# Patient Record
Sex: FEMALE | Race: WHITE | NOT HISPANIC OR LATINO | Employment: UNEMPLOYED | URBAN - METROPOLITAN AREA
[De-identification: names, ages, dates, MRNs, and addresses within clinical notes are randomized per-mention and may not be internally consistent; named-entity substitution may affect disease eponyms.]

---

## 2022-02-11 ENCOUNTER — TRANSFERRED RECORDS (OUTPATIENT)
Dept: HEALTH INFORMATION MANAGEMENT | Facility: CLINIC | Age: 47
End: 2022-02-11

## 2022-06-28 ENCOUNTER — MEDICAL CORRESPONDENCE (OUTPATIENT)
Dept: HEALTH INFORMATION MANAGEMENT | Facility: CLINIC | Age: 47
End: 2022-06-28

## 2022-07-01 ENCOUNTER — TRANSCRIBE ORDERS (OUTPATIENT)
Dept: OTHER | Age: 47
End: 2022-07-01

## 2022-07-01 DIAGNOSIS — G24.4 OROMANDIBULAR DYSTONIA: Primary | ICD-10-CM

## 2022-07-24 ENCOUNTER — HEALTH MAINTENANCE LETTER (OUTPATIENT)
Age: 47
End: 2022-07-24

## 2022-08-22 NOTE — TELEPHONE ENCOUNTER
RECORDS RECEIVED FROM: [facility]    REASON FOR VISIT: Oromandibular dystonia   Date of Appt: 12/05/2022   NOTES (FOR ALL VISITS) STATUS DETAILS   OFFICE NOTE from referring provider Care Everywhere 06/28/2022 [facility]   08/02/2022 orders only   OFFICE NOTE from other specialist Care Everywhere 03/08/2022 [facility]   02/11/2022 [facility]    DISCHARGE SUMMARY from hospital N/A    DISCHARGE REPORT from the ER N/A    OPERATIVE REPORT N/A    MEDICATION LIST N/A    IMAGING  (FOR ALL VISITS)     EMG N/A    EEG N/A    LUMBAR PUNCTURE N/A    BRITTANY SCAN N/A    ULTRASOUND (CAROTID BILAT) *VASCULAR* N/A    MRI (HEAD, NECK, SPINE) Received 02/25/2022 brain   CT (HEAD, NECK, SPINE) N/A       Images in PACS

## 2022-10-03 ENCOUNTER — HEALTH MAINTENANCE LETTER (OUTPATIENT)
Age: 47
End: 2022-10-03

## 2022-11-29 PROBLEM — G47.419 NARCOLEPSY WITHOUT CATAPLEXY(347.00): Status: ACTIVE | Noted: 2018-04-26

## 2022-11-29 PROBLEM — F31.76 BIPOLAR DISORDER, IN FULL REMISSION, MOST RECENT EPISODE DEPRESSED (H): Status: ACTIVE | Noted: 2018-04-12

## 2022-11-29 PROBLEM — E66.811 OBESITY, CLASS I, BMI 30.0-34.9 (SEE ACTUAL BMI): Status: ACTIVE | Noted: 2017-10-23

## 2022-11-29 PROBLEM — G43.909 MIGRAINE WITHOUT STATUS MIGRAINOSUS, NOT INTRACTABLE, UNSPECIFIED MIGRAINE TYPE: Status: ACTIVE | Noted: 2017-01-27

## 2022-12-05 ENCOUNTER — PRE VISIT (OUTPATIENT)
Dept: NEUROLOGY | Facility: CLINIC | Age: 47
End: 2022-12-05

## 2022-12-05 NOTE — PROGRESS NOTES
Department of Neurology  Movement Disorders Division   New Patient Visit    Patient: Purvi Pearce   MRN: [id number]   : 1975   Date of Visit: 2022    CC: involuntary movements    HPI:  Ms. Pearce is a 46 yo woman who presents for evaluation of involuntary movements.  Her movements began over 18 months year ago with a gradual onset.  At first, she didn't notice them but her  pointed them out.  Started around her mouth.  She can't suppress them.  Involves her whole body.  At night she has spasms while trying to fall asleep.  Worsened by being let go from her job.  Sometimes she feels an urge to move.  She is able to suppress it temporarily.  She thinks it's worse in the evenings.  She is stumbling more and dropping things.    She is also reporting cognitive changes.  For example, couldn't remember her passwords.  Loses her train of thought easily.  Also calling objects by the wrong name.  Has trouble with concepts of time.  May have interfered with her job.    Endorses dysphagia.    She had COVID in 2020.  She was on Abilify and Risperdal previously.  She has also taken antiemetics previously.  Zofran sounded familiar to her.    She took CD/LD but didn't notice any change. She has been on Cogentin for awhile but it is causing dry mouth.    They describe an episode where she was found in the kitchen with her hands full and appeared to be seeing things.  The following night she put a bowl and spoon in the kitchen in the middle of the night.  This happened last year.  No other clear episodes but he notices objects put away in odd places.    She is working with a psychiatrist every 3 months and a therapist weekly.      Previously on aripiprazole.  Started on CD/LD to rule out dopa-responsive dystonia.  CAG repeat: 18 and 20 (Normal)    Denies family hx of chorea.  Her mother had dementia and  in her early 70s.  She was in a nursing home for about 10 years.        ROS:  All others negative  except as listed above.    Past Medical History:   Diagnosis Date     Bipolar disorder (H)         Past Surgical History:   Procedure Laterality Date     FOOT SURGERY Right      TONSILLECTOMY & ADENOIDECTOMY       TUBAL LIGATION          Current Outpatient Medications   Medication Sig Dispense Refill     amphetamine-dextroamphetamine (ADDERALL) 20 MG tablet Take 20 mg by mouth 3 times daily       benztropine (COGENTIN) 1 MG tablet Take  by mouth 2 tablets in the morning and 1 tablet at bedtime.       busPIRone HCl (BUSPAR) 30 MG tablet Take 1 tablet by mouth 2 times daily       cholecalciferol 25 MCG (1000 UT) TABS Take 1,000 Units by mouth daily       FLUoxetine (PROZAC) 40 MG capsule Take 80 mg by mouth daily       hydrOXYzine (VISTARIL) 50 MG capsule TAKE 1 TAB BY MOUTH DAILY IN THE MORNING AS NEEDED FOR ANXIETY       Hypertonic Nasal Wash (SINUS RINSE) bottle Instill 1 Each nasally 2 (two) times a day.       LORazepam (ATIVAN) 1 MG tablet Take 1 Tablet by mouth one time a day as needed for panic ; May have 7 tabs each month.       triamcinolone (NASACORT) 55 MCG/ACT nasal aerosol Spray 1 spray into both nostrils twice a day as directed       trihexyphenidyl (ARTANE) 2 MG tablet        carbidopa-levodopa (SINEMET)  MG tablet Take 1 tablet by mouth 3 times daily (Patient not taking: Reported on 12/9/2022)         Allergies   Allergen Reactions     Penicillins Hives        Family History   Problem Relation Age of Onset     Dementia Mother         Social History     Socioeconomic History     Marital status:      Spouse name: Not on file     Number of children: Not on file     Years of education: Not on file     Highest education level: Not on file   Occupational History     Not on file   Tobacco Use     Smoking status: Not on file     Smokeless tobacco: Not on file   Substance and Sexual Activity     Alcohol use: Not on file     Drug use: Not on file     Sexual activity: Not on file   Other Topics  Concern     Not on file   Social History Narrative     Not on file     Social Determinants of Health     Financial Resource Strain: Not on file   Food Insecurity: Not on file   Transportation Needs: Not on file   Physical Activity: Not on file   Stress: Not on file   Social Connections: Not on file   Intimate Partner Violence: Not on file   Housing Stability: Not on file        PHYSICAL EXAM:  /87   Pulse 105   Resp 17   SpO2 97%     Gen: alert, active, attentive, appropriately groomed   HEENT: normocephalic, eyes open with no discharge  Psych: mood stable     NEURO:  CN:  II: Pupils equal, round, and reactive to light. VFF.  III, IV, VI: EOM normal range, no nystagmus.  Normal saccades.  V:  Facial sensation intact.  VII: Face symmetric at rest and with activation. Orofacial chorea.  VIII: Intact to finger rub bilaterally.  IX, X: Palate rise b/l, uvula midline.  No dysarthria.  XI: Trapezius and SCM 5/5 bilaterally.  XII: Tongue protrudes midline. No fasciculation or atrophy noted.    Motor:  Normal bulk and tone throughout upper and lower extremities.  Facial, oral, neck, truncal,UE, LE chorea.  R/L  Shoulder abd      5/5  Elbow flex  5/5  Elbow ext  5/5     5/5    Hip flex   5/5  Knee flex  5/5  Knee ext  5/5  Dorsiflex  5/5  Plantar flex  5/5    Reflexes:  R/L  Biceps   2+/2+  Patellar  2+/2+  Achilles  2+/2+  Plantar   down/down   No clonus.  No frontal release signs.    Sensation:  Intact to LT in all extremities.    Coordination:  FTN and HTN intact bilaterally.    Gait:  Tandem gait impaired.  Able to walk on toes and heels.  Romberg negative.        LABS:  HD gene testing - RESULT: CAG repeat: 18 and 20 (Normal)  TSH 0.35 on 7/8/2022  Free T4 0.9 on 7/8/2022    IMAGING:  MRI brain w/o contrast 2/25/2022 - personally reviewed: unremarkable        ASSESSMENT/PLAN:  Ms. Pearce is a 48 yo woman who presents for evaluation of involuntary movements.  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  The etiology  of this is unknown but HD has been rule out.    - Lab tests: CBC with smear, RFP, LFTs, INOCENTE, copper, ceruloplasmin, ferritin, autoimmune movement disorder panel, paraneoplastic antibodies, PTH, TSH, vitamin B12  - Consider genetic testing  - Start olanzapine for chorea  - RTC 3 months 30 minutes      Halley Lozoya MD    Movement Disorders Division  Department of Neurology      58 minutes spent on the date of the encounter doing chart review, history and exam, documentation and further activities as noted above.

## 2022-12-09 ENCOUNTER — OFFICE VISIT (OUTPATIENT)
Dept: NEUROLOGY | Facility: CLINIC | Age: 47
End: 2022-12-09
Payer: COMMERCIAL

## 2022-12-09 ENCOUNTER — LAB (OUTPATIENT)
Dept: LAB | Facility: CLINIC | Age: 47
End: 2022-12-09
Payer: COMMERCIAL

## 2022-12-09 VITALS
HEART RATE: 105 BPM | OXYGEN SATURATION: 97 % | SYSTOLIC BLOOD PRESSURE: 128 MMHG | RESPIRATION RATE: 17 BRPM | DIASTOLIC BLOOD PRESSURE: 87 MMHG

## 2022-12-09 DIAGNOSIS — G25.5 CHOREA: Primary | ICD-10-CM

## 2022-12-09 DIAGNOSIS — G25.5 CHOREA: ICD-10-CM

## 2022-12-09 LAB
ALBUMIN SERPL BCG-MCNC: 4.2 G/DL (ref 3.5–5.2)
ALP SERPL-CCNC: 72 U/L (ref 35–104)
ALT SERPL W P-5'-P-CCNC: 14 U/L (ref 10–35)
ANION GAP SERPL CALCULATED.3IONS-SCNC: 10 MMOL/L (ref 7–15)
AST SERPL W P-5'-P-CCNC: 18 U/L (ref 10–35)
BASOPHILS # BLD AUTO: 0.1 10E3/UL (ref 0–0.2)
BASOPHILS NFR BLD AUTO: 1 %
BILIRUB DIRECT SERPL-MCNC: <0.2 MG/DL (ref 0–0.3)
BILIRUB SERPL-MCNC: 0.3 MG/DL
BUN SERPL-MCNC: 13.5 MG/DL (ref 6–20)
CALCIUM SERPL-MCNC: 9.3 MG/DL (ref 8.6–10)
CHLORIDE SERPL-SCNC: 105 MMOL/L (ref 98–107)
CREAT SERPL-MCNC: 0.71 MG/DL (ref 0.51–0.95)
DEPRECATED HCO3 PLAS-SCNC: 25 MMOL/L (ref 22–29)
EOSINOPHIL # BLD AUTO: 0.7 10E3/UL (ref 0–0.7)
EOSINOPHIL NFR BLD AUTO: 8 %
ERYTHROCYTE [DISTWIDTH] IN BLOOD BY AUTOMATED COUNT: 13.9 % (ref 10–15)
FERRITIN SERPL-MCNC: 23 NG/ML (ref 6–175)
GFR SERPL CREATININE-BSD FRML MDRD: >90 ML/MIN/1.73M2
GLUCOSE SERPL-MCNC: 126 MG/DL (ref 70–99)
HCT VFR BLD AUTO: 36.1 % (ref 35–47)
HGB BLD-MCNC: 12 G/DL (ref 11.7–15.7)
HOLD SPECIMEN: NORMAL
IMM GRANULOCYTES # BLD: 0 10E3/UL
IMM GRANULOCYTES NFR BLD: 0 %
LYMPHOCYTES # BLD AUTO: 1.9 10E3/UL (ref 0.8–5.3)
LYMPHOCYTES NFR BLD AUTO: 22 %
Lab: NORMAL
MCH RBC QN AUTO: 28.3 PG (ref 26.5–33)
MCHC RBC AUTO-ENTMCNC: 33.2 G/DL (ref 31.5–36.5)
MCV RBC AUTO: 85 FL (ref 78–100)
MONOCYTES # BLD AUTO: 0.5 10E3/UL (ref 0–1.3)
MONOCYTES NFR BLD AUTO: 6 %
NEUTROPHILS # BLD AUTO: 5.5 10E3/UL (ref 1.6–8.3)
NEUTROPHILS NFR BLD AUTO: 63 %
NRBC # BLD AUTO: 0 10E3/UL
NRBC BLD AUTO-RTO: 0 /100
PERFORMING LABORATORY: NORMAL
PHOSPHATE SERPL-MCNC: 3.1 MG/DL (ref 2.5–4.5)
PLATELET # BLD AUTO: 302 10E3/UL (ref 150–450)
POTASSIUM SERPL-SCNC: 3.7 MMOL/L (ref 3.4–5.3)
PROT SERPL-MCNC: 6.7 G/DL (ref 6.4–8.3)
PTH-INTACT SERPL-MCNC: 48 PG/ML (ref 15–65)
RBC # BLD AUTO: 4.24 10E6/UL (ref 3.8–5.2)
RETICS # AUTO: 0.04 10E6/UL (ref 0.03–0.1)
RETICS/RBC NFR AUTO: 0.9 % (ref 0.5–2)
SODIUM SERPL-SCNC: 140 MMOL/L (ref 136–145)
SPECIMEN STATUS: NORMAL
TEST NAME: NORMAL
TSH SERPL DL<=0.005 MIU/L-ACNC: 0.44 UIU/ML (ref 0.3–4.2)
VIT B12 SERPL-MCNC: 625 PG/ML (ref 232–1245)
WBC # BLD AUTO: 8.6 10E3/UL (ref 4–11)

## 2022-12-09 PROCEDURE — 85045 AUTOMATED RETICULOCYTE COUNT: CPT | Performed by: PATHOLOGY

## 2022-12-09 PROCEDURE — 82390 ASSAY OF CERULOPLASMIN: CPT | Performed by: STUDENT IN AN ORGANIZED HEALTH CARE EDUCATION/TRAINING PROGRAM

## 2022-12-09 PROCEDURE — 86341 ISLET CELL ANTIBODY: CPT | Performed by: PATHOLOGY

## 2022-12-09 PROCEDURE — 99204 OFFICE O/P NEW MOD 45 MIN: CPT | Performed by: STUDENT IN AN ORGANIZED HEALTH CARE EDUCATION/TRAINING PROGRAM

## 2022-12-09 PROCEDURE — 36415 COLL VENOUS BLD VENIPUNCTURE: CPT | Performed by: PATHOLOGY

## 2022-12-09 PROCEDURE — 85060 BLOOD SMEAR INTERPRETATION: CPT | Performed by: PATHOLOGY

## 2022-12-09 PROCEDURE — 86596 VOLTAGE-GTD CA CHNL ANTB EA: CPT | Performed by: PATHOLOGY

## 2022-12-09 PROCEDURE — 83970 ASSAY OF PARATHORMONE: CPT | Performed by: PATHOLOGY

## 2022-12-09 PROCEDURE — 83519 RIA NONANTIBODY: CPT | Mod: 90 | Performed by: PATHOLOGY

## 2022-12-09 PROCEDURE — 82607 VITAMIN B-12: CPT | Performed by: STUDENT IN AN ORGANIZED HEALTH CARE EDUCATION/TRAINING PROGRAM

## 2022-12-09 PROCEDURE — 86255 FLUORESCENT ANTIBODY SCREEN: CPT | Mod: 90 | Performed by: PATHOLOGY

## 2022-12-09 PROCEDURE — 84100 ASSAY OF PHOSPHORUS: CPT | Performed by: PATHOLOGY

## 2022-12-09 PROCEDURE — 80050 GENERAL HEALTH PANEL: CPT | Performed by: PATHOLOGY

## 2022-12-09 PROCEDURE — 82728 ASSAY OF FERRITIN: CPT | Performed by: STUDENT IN AN ORGANIZED HEALTH CARE EDUCATION/TRAINING PROGRAM

## 2022-12-09 PROCEDURE — 99000 SPECIMEN HANDLING OFFICE-LAB: CPT | Performed by: PATHOLOGY

## 2022-12-09 PROCEDURE — 84182 PROTEIN WESTERN BLOT TEST: CPT | Performed by: PATHOLOGY

## 2022-12-09 PROCEDURE — 82248 BILIRUBIN DIRECT: CPT | Performed by: PATHOLOGY

## 2022-12-09 PROCEDURE — 82525 ASSAY OF COPPER: CPT | Mod: 90 | Performed by: PATHOLOGY

## 2022-12-09 PROCEDURE — 86038 ANTINUCLEAR ANTIBODIES: CPT | Performed by: STUDENT IN AN ORGANIZED HEALTH CARE EDUCATION/TRAINING PROGRAM

## 2022-12-09 PROCEDURE — 86255 FLUORESCENT ANTIBODY SCREEN: CPT | Performed by: PATHOLOGY

## 2022-12-09 RX ORDER — OLANZAPINE 5 MG/1
TABLET ORAL
Qty: 30 TABLET | Refills: 11 | Status: SHIPPED | OUTPATIENT
Start: 2022-12-09 | End: 2023-03-20

## 2022-12-09 RX ORDER — TRIHEXYPHENIDYL HYDROCHLORIDE 2 MG/1
TABLET ORAL
COMMUNITY
Start: 2022-11-11 | End: 2023-03-07

## 2022-12-09 ASSESSMENT — PAIN SCALES - GENERAL: PAINLEVEL: MILD PAIN (3)

## 2022-12-09 ASSESSMENT — PATIENT HEALTH QUESTIONNAIRE - PHQ9: SUM OF ALL RESPONSES TO PHQ QUESTIONS 1-9: 22

## 2022-12-09 NOTE — PATIENT INSTRUCTIONS
You have chorea.  This has many causes so we will start with some blood tests.    I also recommend you start taking olanzapine (zyprexa) to help with the movements.  Take 1/2 pill once per day for 2 weeks and then increase to 1 pill after that.

## 2022-12-09 NOTE — NURSING NOTE
Chief Complaint   Patient presents with     Consult     Dystonia     Depression Response    Patient completed the PHQ-9 assessment for depression and scored >9? Yes  Question 9 on the PHQ-9 was positive for suicidality? No  Does patient have current mental health provider? Yes    Is this a virtual visit? No    I personally notified the following: visit provider       Patient has mental health care, comfortable with care.       Gutierrez Garcia, EMT

## 2022-12-09 NOTE — LETTER
2022       RE: Purvi Pearce  [address] [address]     Dear Colleague,    Thank you for referring your patient, Purvi Pearce, to the [facility]. Please see a copy of my visit note below.    Department of Neurology  Movement Disorders Division   New Patient Visit    Patient: Purvi Pearce   MRN: [id number]   : 1975   Date of Visit: 2022    CC: involuntary movements    HPI:  Ms. Pearce is a 48 yo woman who presents for evaluation of involuntary movements.  Her movements began over 18 months year ago with a gradual onset.  At first, she didn't notice them but her  pointed them out.  Started around her mouth.  She can't suppress them.  Involves her whole body.  At night she has spasms while trying to fall asleep.  Worsened by being let go from her job.  Sometimes she feels an urge to move.  She is able to suppress it temporarily.  She thinks it's worse in the evenings.  She is stumbling more and dropping things.    She is also reporting cognitive changes.  For example, couldn't remember her passwords.  Loses her train of thought easily.  Also calling objects by the wrong name.  Has trouble with concepts of time.  May have interfered with her job.    Endorses dysphagia.    She had COVID in 2020.  She was on Abilify and Risperdal previously.  She has also taken antiemetics previously.  Zofran sounded familiar to her.    She took CD/LD but didn't notice any change. She has been on Cogentin for awhile but it is causing dry mouth.    They describe an episode where she was found in the kitchen with her hands full and appeared to be seeing things.  The following night she put a bowl and spoon in the kitchen in the middle of the night.  This happened last year.  No other clear episodes but he notices objects put away in odd places.    She is working with a psychiatrist every 3 months and  a therapist weekly.      Previously on aripiprazole.  Started on CD/LD to rule out dopa-responsive dystonia.  CAG repeat: 18 and 20 (Normal)    Denies family hx of chorea.  Her mother had dementia and  in her early 70s.  She was in a nursing home for about 10 years.        ROS:  All others negative except as listed above.    Past Medical History:   Diagnosis Date     Bipolar disorder (H)         Past Surgical History:   Procedure Laterality Date     FOOT SURGERY Right      TONSILLECTOMY & ADENOIDECTOMY       TUBAL LIGATION          Current Outpatient Medications   Medication Sig Dispense Refill     amphetamine-dextroamphetamine (ADDERALL) 20 MG tablet Take 20 mg by mouth 3 times daily       benztropine (COGENTIN) 1 MG tablet Take  by mouth 2 tablets in the morning and 1 tablet at bedtime.       busPIRone HCl (BUSPAR) 30 MG tablet Take 1 tablet by mouth 2 times daily       cholecalciferol 25 MCG (1000 UT) TABS Take 1,000 Units by mouth daily       FLUoxetine (PROZAC) 40 MG capsule Take 80 mg by mouth daily       hydrOXYzine (VISTARIL) 50 MG capsule TAKE 1 TAB BY MOUTH DAILY IN THE MORNING AS NEEDED FOR ANXIETY       Hypertonic Nasal Wash (SINUS RINSE) bottle Instill 1 Each nasally 2 (two) times a day.       LORazepam (ATIVAN) 1 MG tablet Take 1 Tablet by mouth one time a day as needed for panic ; May have 7 tabs each month.       triamcinolone (NASACORT) 55 MCG/ACT nasal aerosol Spray 1 spray into both nostrils twice a day as directed       trihexyphenidyl (ARTANE) 2 MG tablet        carbidopa-levodopa (SINEMET)  MG tablet Take 1 tablet by mouth 3 times daily (Patient not taking: Reported on 2022)         Allergies   Allergen Reactions     Penicillins Hives        Family History   Problem Relation Age of Onset     Dementia Mother         Social History     Socioeconomic History     Marital status:      Spouse name: Not on file     Number of children: Not on file     Years of education: Not on  file     Highest education level: Not on file   Occupational History     Not on file   Tobacco Use     Smoking status: Not on file     Smokeless tobacco: Not on file   Substance and Sexual Activity     Alcohol use: Not on file     Drug use: Not on file     Sexual activity: Not on file   Other Topics Concern     Not on file   Social History Narrative     Not on file     Social Determinants of Health     Financial Resource Strain: Not on file   Food Insecurity: Not on file   Transportation Needs: Not on file   Physical Activity: Not on file   Stress: Not on file   Social Connections: Not on file   Intimate Partner Violence: Not on file   Housing Stability: Not on file        PHYSICAL EXAM:  /87   Pulse 105   Resp 17   SpO2 97%     Gen: alert, active, attentive, appropriately groomed   HEENT: normocephalic, eyes open with no discharge  Psych: mood stable     NEURO:  CN:  II: Pupils equal, round, and reactive to light. VFF.  III, IV, VI: EOM normal range, no nystagmus.  Normal saccades.  V:  Facial sensation intact.  VII: Face symmetric at rest and with activation. Orofacial chorea.  VIII: Intact to finger rub bilaterally.  IX, X: Palate rise b/l, uvula midline.  No dysarthria.  XI: Trapezius and SCM 5/5 bilaterally.  XII: Tongue protrudes midline. No fasciculation or atrophy noted.    Motor:  Normal bulk and tone throughout upper and lower extremities.  Facial, oral, neck, truncal,UE, LE chorea.  R/L  Shoulder abd      5/5  Elbow flex  5/5  Elbow ext  5/5     5/5    Hip flex   5/5  Knee flex  5/5  Knee ext  5/5  Dorsiflex  5/5  Plantar flex  5/5    Reflexes:  R/L  Biceps   2+/2+  Patellar  2+/2+  Achilles  2+/2+  Plantar   down/down   No clonus.  No frontal release signs.    Sensation:  Intact to LT in all extremities.    Coordination:  FTN and HTN intact bilaterally.    Gait:  Tandem gait impaired.  Able to walk on toes and heels.  Romberg negative.        LABS:  HD gene testing - RESULT: CAG repeat: 18  and 20 (Normal)  TSH 0.35 on 7/8/2022  Free T4 0.9 on 7/8/2022    IMAGING:  MRI brain w/o contrast 2/25/2022 - personally reviewed: unremarkable        ASSESSMENT/PLAN:  Ms. Pearce is a 46 yo woman who presents for evaluation of involuntary movements.  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  The etiology of this is unknown but HD has been rule out.    - Lab tests: CBC with smear, RFP, LFTs, INOCENTE, copper, ceruloplasmin, ferritin, autoimmune movement disorder panel, paraneoplastic antibodies, PTH, TSH, vitamin B12  - Consider genetic testing  - Start olanzapine for chorea  - RTC 3 months 30 minutes        58 minutes spent on the date of the encounter doing chart review, history and exam, documentation and further activities as noted above.          Again, thank you for allowing me to participate in the care of your patient.      Sincerely,    Halley Lozoya MD

## 2022-12-11 LAB — COPPER SERPL-MCNC: 124 UG/DL

## 2022-12-12 LAB
ANA SER QL IF: NEGATIVE
CERULOPLASMIN SERPL-MCNC: 29 MG/DL (ref 20–60)
PATH REPORT.COMMENTS IMP SPEC: NORMAL
PATH REPORT.FINAL DX SPEC: NORMAL
PATH REPORT.MICROSCOPIC SPEC OTHER STN: NORMAL
PATH REPORT.MICROSCOPIC SPEC OTHER STN: NORMAL
PATH REPORT.RELEVANT HX SPEC: NORMAL

## 2022-12-23 ENCOUNTER — TELEPHONE (OUTPATIENT)
Dept: NEUROLOGY | Facility: CLINIC | Age: 47
End: 2022-12-23

## 2022-12-23 DIAGNOSIS — G25.5 CHOREA: ICD-10-CM

## 2022-12-23 DIAGNOSIS — C80.1 PARANEOPLASTIC NEUROLOGIC DISORDER (H): Primary | ICD-10-CM

## 2022-12-23 DIAGNOSIS — G98.8 PARANEOPLASTIC NEUROLOGIC DISORDER (H): Primary | ICD-10-CM

## 2022-12-23 LAB — MAYO MISC RESULT: ABNORMAL

## 2022-12-23 NOTE — TELEPHONE ENCOUNTER
"[facility] lab result for the autoimmune/paraneoplastic evaluation came back positive for \"calcium channel binding antibody P/Q type.\" Per Dr. Lozoya the plan will be to obtain and MRI of the brain with contrast, CT of chest and abdomen to look for masses, and a few more labs. It is unclear what this means for her chorea symptoms, if anything and she is looking into the research. Emphasized this does not mean that she has cancer. Cancer is something we want to rule it since less than 20% of people have had cancer who test positive for this antibody.    She has not history of cancer or an autoimmune disease. She smoked cigarettes, about 1 pack a day for about 30 years. She quit in 2016.    Plan:  1. Mri of the brain with contrast  2. CT of chest, abdomen and pelvis to look for masses  3. Labs (blood tests) to rule out cancer and autoimmune diseases  4. Orders will be sent to her primary doctors office. I will send a [employer] message once they are faxed over  5. Per Purvi's request I will send the name of the positive antibody result on [employer]    8 minute phone call    "

## 2022-12-23 NOTE — RESULT ENCOUNTER NOTE
Chorea lab workup revealed a positive result: calcium channel binding antibody P/Q type.  Will need to further review the literature regarding the presence of this antibody in chorea.  Will be important to review if she has any history of cancer, smoking, or other autoimmune disease.

## 2022-12-26 ENCOUNTER — MYC MEDICAL ADVICE (OUTPATIENT)
Dept: NEUROLOGY | Facility: CLINIC | Age: 47
End: 2022-12-26

## 2022-12-27 NOTE — TELEPHONE ENCOUNTER
Orders placed for:  - MRI brain w/wo contrast  - CT chest/abdomen/pelvis  - CA 15-3 level  - CEA level

## 2022-12-27 NOTE — TELEPHONE ENCOUNTER
----- Message from Bere Gamble RN sent at 12/27/2022  9:19 AM CST -----  Hi Neeta,    Can you fax the following orders to her PCP (listed in Epic)? Please let me know when they are sent.    Imaging:  - MRI brain w/wo contrast  - CT chest/abdomen/pelvis  Labs:  - CA 15-3 level  - CEA level    Thank you,  Bere Gamble RN

## 2022-12-28 LAB
CASPR2-IGG CBA,S: NEGATIVE
LGI1-IGG CBA,S: NEGATIVE

## 2022-12-29 LAB
AMPHIPHYSIN AB TITR SER: NEGATIVE TITER
CV2 IGG TITR SER: NEGATIVE TITER
GLIAL NUC TYPE 1 AB TITR SER: NEGATIVE TITER
HU1 AB TITR SER: NEGATIVE TITER
HU2 AB TITR SER IF: NEGATIVE TITER
HU3 AB TITR SER: NEGATIVE TITER
IMMUNOLOGIST REVIEW: ABNORMAL
LABORATORY COMMENT REPORT: ABNORMAL
PCA-1 AB TITR SER: NEGATIVE TITER
PCA-2 AB TITR SER: NEGATIVE TITER
PCA-TR AB TITR SER IF: NEGATIVE TITER
VGCC-P/Q BIND AB SER-SCNC: 0.1 NMOL/L
VGKC AB SER-SCNC: 0.02 NMOL/L

## 2023-01-10 ENCOUNTER — DOCUMENTATION ONLY (OUTPATIENT)
Dept: NEUROLOGY | Facility: CLINIC | Age: 48
End: 2023-01-10

## 2023-01-10 NOTE — PROGRESS NOTES
Lab results received from [facility]. Dr. Lozoya was sent copies of the results and they were sent to be scanned.    -Cancer Antigen 15-3 (CA 15-3 Boyd) Value: <8. Normal: <30 U/mL  -Carcinoembryonic Antigen (CEA) Value: 2.2. Normal: 0-5.0 ng/mL

## 2023-01-23 ENCOUNTER — MYC MEDICAL ADVICE (OUTPATIENT)
Dept: NEUROLOGY | Facility: CLINIC | Age: 48
End: 2023-01-23
Payer: COMMERCIAL

## 2023-01-23 ENCOUNTER — TELEPHONE (OUTPATIENT)
Dept: NEUROLOGY | Facility: CLINIC | Age: 48
End: 2023-01-23
Payer: COMMERCIAL

## 2023-01-25 NOTE — TELEPHONE ENCOUNTER
[facility] Call Center    Phone Message    May a detailed message be left on voicemail: yes     Reason for Call: Other: Purvi is returning you call. Please follow up with her at [phone].     Action Taken: Message routed to:  [facility] ([facility] Neurology    Travel Screening: Not Applicable    Marcos Whitman on 1/25/2023 at 9:57 AM  Neurology Intake Representative

## 2023-01-25 NOTE — TELEPHONE ENCOUNTER
Reviewed lab results. She is not sure her chorea has improved, she is still having movements. She still has them but is not sure they improved. She will ask her  and let us know if he has noticed a difference. She is taking 1 tablet (5 mg) of olanzapine daily. She has not noticed side effects of dizziness/low BP, restlessness, sleepiness, or tremor. She feels her appetite increased a little since starting it. Her MRI and CT scans are scheduled for 1/31.

## 2023-02-06 RX ORDER — TRIHEXYPHENIDYL HYDROCHLORIDE 2 MG/1
TABLET ORAL
COMMUNITY
Start: 2022-11-11 | End: 2023-05-10

## 2023-02-07 NOTE — TELEPHONE ENCOUNTER
Spoke to Michelle in imaging. She pushed over the images and will fax over the report now for MR brain and CT chest/abdomen/pelvis.    Called Tessie in the film room. She has not yet received the images.

## 2023-02-08 ENCOUNTER — DOCUMENTATION ONLY (OUTPATIENT)
Dept: NEUROLOGY | Facility: CLINIC | Age: 48
End: 2023-02-08
Payer: COMMERCIAL

## 2023-02-08 NOTE — PROGRESS NOTES
Received imaging records from [facility]   Records Date of service: 1/31/23  Copy has been sent to scanning and encounter routed to specialty nurse for review.

## 2023-03-06 NOTE — PROGRESS NOTES
Video-Visit Details  Patient has given verbal consent for Video visit? Yes    Type of service:  Video Visit    Video Start Time: 4:33 PM     Video End Time (time video stopped): 5:14 PM     Duration: 41 minute    Originating Location (pt. Location): Home    Distant Location (provider location):  On-site, [facility] [place]     Mode of Communication:  Video Conference via [employer]  -----------------------------------------------------------------------------------------------------------------------  Department of Neurology  Movement Disorders Division   Follow-up Note    Patient: Purvi Pearce   MRN: [id number]   : 1975   Date of Visit: 3/7/2023    INTERVAL EVENTS:  Ms. Roque is a 48 yo woman with generalized chorea of unknown etiology who returns for follow-up.  She is noticing that more of her body is moving; however her  thinks they may be improving.  Zyprexa increased her appetite.  Has some loss of balance when she stands up and gets lightheaded.      Movements came on over 3-4 months.  Not temporally related to Abilify or Risperdal.  She is able to temporarily suppress but feels the urge to move.  When she's asleep it improves but doesn't resolve.  While asleep it still occurs in her hands and somewhat in her legs.  Dropping things a lot.  Endorses quick jerk-like movements.    There was a recent event where she lost track of midnight to 4 am.  Nothing was out of place.  No reports of seizure like motor activity.  She reports a history of nacrolepsy.    Cognition is stable.  Had some prior testing in [place], with [facility] that sounds consistent with neuropsych testing. Reports that sometimes she almost chokes.  Not specific to a type of food.  Only occurs with solids, not liquids.  Feels like it wants to get stuck.  Hasn't had any evaluation.    She reports feeling down a lot of the time.  Thinks she has had some episodes of caitlin.  Irritable.  Previously had  hallucinations, most recently months ago.  Would think somone was there or saw something out of the corner of her eye. Wonder if this was medication side effect.  This happened between our last visit and now.  She has a psychiatrist and a therapist.  Hasn't seen her psychiatrist in a few months.  Presently has SI.  No recent plan but has previously.  Prior suicide attempt in 2011.    She is having numbness in her hands.  Suspected CTS so she was given braces for overnight. Endorses some hearing loss.  She has also noticed changes in her vision.  It is blurry and she suspects that she needs a new glasses prescription.  Denies rashes.  Endorses joint and muscle pain.    No family hx of chorea.  No other family hx of neurologic disease other than dementia in her mother.    Remote hx of amphetamines use.      Related Medications     Olanzapine 5mg 1    Trihexyphenidyl 2mg 1 1       ROS:  All others negative except as listed above.    Past Medical History:   Diagnosis Date     Bipolar disorder (H)         Past Surgical History:   Procedure Laterality Date     FOOT SURGERY Right      TONSILLECTOMY & ADENOIDECTOMY       TUBAL LIGATION          Current Outpatient Medications   Medication Sig Dispense Refill     amphetamine-dextroamphetamine (ADDERALL) 20 MG tablet Take 20 mg by mouth 3 times daily       busPIRone HCl (BUSPAR) 30 MG tablet Take 1 tablet by mouth 2 times daily       cholecalciferol 25 MCG (1000 UT) TABS Take 1,000 Units by mouth daily       doxycycline monohydrate (MONODOX) 100 MG capsule Take 1 capsule by mouth See Admin Instructions       FLUoxetine (PROZAC) 40 MG capsule Take 80 mg by mouth daily       hydrOXYzine (VISTARIL) 50 MG capsule TAKE 1 TAB BY MOUTH DAILY IN THE MORNING AS NEEDED FOR ANXIETY       Hypertonic Nasal Wash (SINUS RINSE) bottle Instill 1 Each nasally 2 (two) times a day.       LORazepam (ATIVAN) 1 MG tablet Take 1 Tablet by mouth one time a day as needed for panic ; May have 7 tabs  each month.       OLANZapine (ZYPREXA) 5 MG tablet Take 1/2 pill once daily for 2 weeks, then increase to 1 pill daily. 30 tablet 11     triamcinolone (NASACORT) 55 MCG/ACT nasal aerosol Spray 1 spray into both nostrils twice a day as directed       trihexyphenidyl (ARTANE) 2 MG tablet ?2mg 2/day         Allergies   Allergen Reactions     Penicillins Hives        Family History   Problem Relation Age of Onset     Dementia Mother         Social History     Socioeconomic History     Marital status:      Spouse name: Not on file     Number of children: Not on file     Years of education: Not on file     Highest education level: Not on file   Occupational History     Not on file   Tobacco Use     Smoking status: Not on file     Smokeless tobacco: Not on file   Substance and Sexual Activity     Alcohol use: Not on file     Drug use: Not on file     Sexual activity: Not on file   Other Topics Concern     Not on file   Social History Narrative     Not on file     Social Determinants of Health     Financial Resource Strain: Not on file   Food Insecurity: Not on file   Transportation Needs: Not on file   Physical Activity: Not on file   Stress: Not on file   Social Connections: Not on file   Intimate Partner Violence: Not on file   Housing Stability: Not on file        PHYSICAL EXAM:  There were no vitals taken for this visit.  No motor impersistence with tongue protrusion  Normal Luria  Prominent orobuccolingual dyskinesia  Facial, truncal, and extremity chorea    LABS:   Latest Reference Range & Units 12/09/22 16:33   Sodium 136 - 145 mmol/L 140   Potassium 3.4 - 5.3 mmol/L 3.7   Chloride 98 - 107 mmol/L 105   Carbon Dioxide (CO2) 22 - 29 mmol/L 25   Urea Nitrogen 6.0 - 20.0 mg/dL 13.5   Creatinine 0.51 - 0.95 mg/dL 0.71   GFR Estimate >60 mL/min/1.73m2 >90   Calcium 8.6 - 10.0 mg/dL 9.3   Anion Gap 7 - 15 mmol/L 10   Phosphorus 2.5 - 4.5 mg/dL 3.1   Albumin 3.5 - 5.2 g/dL 4.2   Protein Total 6.4 - 8.3 g/dL 6.7    Alkaline Phosphatase 35 - 104 U/L 72   ALT 10 - 35 U/L 14   AST 10 - 35 U/L 18   Bilirubin Direct 0.00 - 0.30 mg/dL <0.20   Bilirubin Total <=1.2 mg/dL 0.3   Copper 80.0 - 155.0 ug/dL 124.0   Ferritin 6 - 175 ng/mL 23   Glucose 70 - 99 mg/dL 126 (H)   TSH 0.30 - 4.20 uIU/mL 0.44   Vitamin B12 232 - 1,245 pg/mL 625      Latest Reference Range & Units 12/09/22 16:33   WBC 4.0 - 11.0 10e3/uL 8.6   Hemoglobin 11.7 - 15.7 g/dL 12.0   Hematocrit 35.0 - 47.0 % 36.1   Platelet Count 150 - 450 10e3/uL 302   RBC Count 3.80 - 5.20 10e6/uL 4.24   MCV 78 - 100 fL 85   MCH 26.5 - 33.0 pg 28.3   MCHC 31.5 - 36.5 g/dL 33.2   RDW 10.0 - 15.0 % 13.9   % Neutrophils % 63   % Lymphocytes % 22   % Monocytes % 6   % Eosinophils % 8   % Basophils % 1   Absolute Basophils 0.0 - 0.2 10e3/uL 0.1   Absolute Eosinophils 0.0 - 0.7 10e3/uL 0.7   Absolute Immature Granulocytes <=0.4 10e3/uL 0.0   Absolute Lymphocytes 0.8 - 5.3 10e3/uL 1.9   Absolute Monocytes 0.0 - 1.3 10e3/uL 0.5   % Immature Granulocytes % 0   Absolute Neutrophils 1.6 - 8.3 10e3/uL 5.5   Absolute NRBCs 10e3/uL 0.0   NRBCs per 100 WBC <1 /100 0   % Retic 0.5 - 2.0 % 0.9   Absolute Retic 0.025 - 0.095 10e6/uL 0.037      Latest Reference Range & Units 12/09/22 16:33   Ceruloplasmin 20 - 60 mg/dL 29   Parathyroid Hormone Intact 15 - 65 pg/mL 48     CA 15-3 Boyd <8. wnl  CEA: 2.2. wnl    Movement, Autoimm/Paraneo, S     Amphiphysin Ab, S                 Negative          titer   <1:240       AGNA-1, S                         Negative          titer   <1:240        OCTAVIO-1, S                         Negative          titer   <1:240       Reflex Added                      None.                                 OCTAVIO-2, S                         Negative          titer   <1:240       OCTAVIO-3, S                         Negative          titer   <1:240       CASPR2-IgG CBA, S                 Negative                  Negative      CRMP-5-IgG, S                     Negative          titer    <1:240       CRMP-5-IgG Western Blot, S        Negative                  Negative     DPPX Ab IFA, S                    Negative                  Negative     GAD65 Ab Assay, S                 0.00              nmol/L  <= 0.02      GRAF1 IFA, S                      Negative                  Negative     IgLON5 IFA, S                     Negative                  Negative     ITPR1 IFA, S                      Negative                  Negative     KLHL11 Ab CBA, S                  Negative                  Negative     LGI1-IgG CBA, S                   Negative                  Negative     mGluR1 Ab IFA, S                  Negative                  Negative     NIF IFA, S                        Negative                  Negative     NMDA-R Ab CBA, S                  Negative                  Negative     P/Q-Type Calcium Channel Ab       0.11         H    nmol/L  <=0.02  * This profile, in the proper       clinical context, would support an autoimmune movement       disorder, such as ataxia. * The positive predictive value       for an autoimmune neurological diagnosis among patients       with positive values for P/Q -type calcium channel       antibodies 0.10-0.99 nM is 24%. * The positive predictive       value for a cancer diagnosis (diverse types) among patients       positive for P/Q-type calcium channel antibody is 21%;       approximately 18% are historical neoplasms, and 3% are       detected prospectively. * References: Chava NL, Aleks       VA, Clayton DH, Luis CJ, Laura SJ, Kayleigh COTE. P/Q- and       N-type calcium-channel antibodies: Oncological,       neurological, and serological accompaniments. Muscle Nerve.       2016;54:220-7. *       PCA-1, S                          Negative          titer   <1:240       PCA-2, S                          Negative          titer   <1:240       PCA-Tr, S                         Negative          titer   <1:240       IMAGING:  MRI brain w/wo contrast -  personally reviewed: unremarkable  CT chest, abd, pelvis w/ contrast -   Chest: There are no pulmonary nodules. There are no infiltrates or effusions. There is no mediastinal or hilar adenopathy. The heart and vascular structures are within normal limits. There is no axillary or supraclavicular adenopathy.     Abdomen pelvis: The liver, gallbladder, pancreas, spleen are unremarkable. The adrenal glands are symmetric. The kidneys demonstrate symmetric excretion of contrast. There is no hydronephrosis. There is no large or small bowel distention. There is no abdominal or pelvic adenopathy.     There are no skeletal lesions.          ASSESSMENT/PLAN:  Ms. 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  Lab work-up only reviewed a positive P/Q type calcium channel antibody.  This can be associated with malignancy but CT chest, abdomen, and pelvis were negative.  This antibody in isolation is non-specific.  Review of the literature did not reveal any published cases of chorea associated with his antibody alone.  I suspect this in incidental finding.      The etiology of her chorea remains elusive.  She has history of exposure to neuroleptics but reports that this was remote to her symptom development.  Next step is to search for a genetic cause of her dystonia.  For treatment, will increase olanzapine as this has not yet been effective.  She is having increased appetite due to this medication; however VMAT2 inhibitors are very unfavorable given her depression and suicidal ideation.  She is working with a therapist.    - Genetics referral  - Increase olanzapine to 7.5mg daily  - Ok to discontinue trihexyphenidyl  - RTC 3 months, 30 minutes virtual      Halley Lozoya MD   of Neurology  Movement Disorders Division      41 minutes spent on the date of the encounter doing chart review, history and exam, documentation and further activities as  noted above.

## 2023-03-07 ENCOUNTER — VIRTUAL VISIT (OUTPATIENT)
Dept: NEUROLOGY | Facility: CLINIC | Age: 48
End: 2023-03-07
Payer: COMMERCIAL

## 2023-03-07 DIAGNOSIS — G25.5 CHOREA: Primary | ICD-10-CM

## 2023-03-07 PROCEDURE — 99215 OFFICE O/P EST HI 40 MIN: CPT | Mod: VID | Performed by: STUDENT IN AN ORGANIZED HEALTH CARE EDUCATION/TRAINING PROGRAM

## 2023-03-07 RX ORDER — DOXYCYCLINE 100 MG/1
1 CAPSULE ORAL SEE ADMIN INSTRUCTIONS
COMMUNITY
Start: 2023-02-21 | End: 2023-10-24

## 2023-03-07 NOTE — PROGRESS NOTES
Purvi is a 47 year old who is being evaluated via a billable video visit.      How would you like to obtain your AVS? Mychart  If the video visit is dropped, the invitation should be resent by: [phone]        Video-Visit Details    Type of service:  Video Visit     Originating Location (pt. Location): Home    Distant Location (provider location):  On-site  Platform used for Video Visit: ZIRX

## 2023-03-07 NOTE — PATIENT INSTRUCTIONS
Increase your olanzapine to 1.5 pills each day.  In 2 weeks let me know if your movements have improved at all.

## 2023-03-07 NOTE — LETTER
3/7/2023       RE: Purvi Pearce  [address] [place] [id number]     Dear Colleague,    Thank you for referring your patient, Purvi Pearce, to the [facility] [place] at [facility]. Please see a copy of my visit note below.    Video-Visit Details  Patient has given verbal consent for Video visit? Yes    Type of service:  Video Visit    Video Start Time: 4:33 PM     Video End Time (time video stopped): 5:14 PM     Duration: 41 minute    Originating Location (pt. Location): Home    Distant Location (provider location):  On-site, [facility] [place]     Mode of Communication:  Video Conference via Yu Rong  -----------------------------------------------------------------------------------------------------------------------  Department of Neurology  Movement Disorders Division   Follow-up Note    Patient: Purvi Pearce   MRN: [id number]   : 1975   Date of Visit: 3/7/2023    INTERVAL EVENTS:  Ms. Roque is a 48 yo woman with generalized chorea of unknown etiology who returns for follow-up.  She is noticing that more of her body is moving; however her  thinks they may be improving.  Zyprexa increased her appetite.  Has some loss of balance when she stands up and gets lightheaded.      Movements came on over 3-4 months.  Not temporally related to Abilify or Risperdal.  She is able to temporarily suppress but feels the urge to move.  When she's asleep it improves but doesn't resolve.  While asleep it still occurs in her hands and somewhat in her legs.  Dropping things a lot.  Endorses quick jerk-like movements.    There was a recent event where she lost track of midnight to 4 am.  Nothing was out of place.  No reports of seizure like motor activity.  She reports a history of nacrolepsy.    Cognition is stable.  Had some prior testing in [place], with [facility] that sounds consistent with neuropsych  testing. Reports that sometimes she almost chokes.  Not specific to a type of food.  Only occurs with solids, not liquids.  Feels like it wants to get stuck.  Hasn't had any evaluation.    She reports feeling down a lot of the time.  Thinks she has had some episodes of caitlin.  Irritable.  Previously had hallucinations, most recently months ago.  Would think somone was there or saw something out of the corner of her eye. Wonder if this was medication side effect.  This happened between our last visit and now.  She has a psychiatrist and a therapist.  Hasn't seen her psychiatrist in a few months.  Presently has SI.  No recent plan but has previously.  Prior suicide attempt in 2011.    She is having numbness in her hands.  Suspected CTS so she was given braces for overnight. Endorses some hearing loss.  She has also noticed changes in her vision.  It is blurry and she suspects that she needs a new glasses prescription.  Denies rashes.  Endorses joint and muscle pain.    No family hx of chorea.  No other family hx of neurologic disease other than dementia in her mother.    Remote hx of amphetamines use.      Related Medications     Olanzapine 5mg 1    Trihexyphenidyl 2mg 1 1       ROS:  All others negative except as listed above.    Past Medical History:   Diagnosis Date     Bipolar disorder (H)         Past Surgical History:   Procedure Laterality Date     FOOT SURGERY Right      TONSILLECTOMY & ADENOIDECTOMY       TUBAL LIGATION          Current Outpatient Medications   Medication Sig Dispense Refill     amphetamine-dextroamphetamine (ADDERALL) 20 MG tablet Take 20 mg by mouth 3 times daily       busPIRone HCl (BUSPAR) 30 MG tablet Take 1 tablet by mouth 2 times daily       cholecalciferol 25 MCG (1000 UT) TABS Take 1,000 Units by mouth daily       doxycycline monohydrate (MONODOX) 100 MG capsule Take 1 capsule by mouth See Admin Instructions       FLUoxetine (PROZAC) 40 MG capsule Take 80 mg by mouth daily        hydrOXYzine (VISTARIL) 50 MG capsule TAKE 1 TAB BY MOUTH DAILY IN THE MORNING AS NEEDED FOR ANXIETY       Hypertonic Nasal Wash (SINUS RINSE) bottle Instill 1 Each nasally 2 (two) times a day.       LORazepam (ATIVAN) 1 MG tablet Take 1 Tablet by mouth one time a day as needed for panic ; May have 7 tabs each month.       OLANZapine (ZYPREXA) 5 MG tablet Take 1/2 pill once daily for 2 weeks, then increase to 1 pill daily. 30 tablet 11     triamcinolone (NASACORT) 55 MCG/ACT nasal aerosol Spray 1 spray into both nostrils twice a day as directed       trihexyphenidyl (ARTANE) 2 MG tablet ?2mg 2/day         Allergies   Allergen Reactions     Penicillins Hives        Family History   Problem Relation Age of Onset     Dementia Mother         Social History     Socioeconomic History     Marital status:      Spouse name: Not on file     Number of children: Not on file     Years of education: Not on file     Highest education level: Not on file   Occupational History     Not on file   Tobacco Use     Smoking status: Not on file     Smokeless tobacco: Not on file   Substance and Sexual Activity     Alcohol use: Not on file     Drug use: Not on file     Sexual activity: Not on file   Other Topics Concern     Not on file   Social History Narrative     Not on file     Social Determinants of Health     Financial Resource Strain: Not on file   Food Insecurity: Not on file   Transportation Needs: Not on file   Physical Activity: Not on file   Stress: Not on file   Social Connections: Not on file   Intimate Partner Violence: Not on file   Housing Stability: Not on file        PHYSICAL EXAM:  There were no vitals taken for this visit.  No motor impersistence with tongue protrusion  Normal Luria  Prominent orobuccolingual dyskinesia  Facial, truncal, and extremity chorea    LABS:   Latest Reference Range & Units 12/09/22 16:33   Sodium 136 - 145 mmol/L 140   Potassium 3.4 - 5.3 mmol/L 3.7   Chloride 98 - 107 mmol/L 105    Carbon Dioxide (CO2) 22 - 29 mmol/L 25   Urea Nitrogen 6.0 - 20.0 mg/dL 13.5   Creatinine 0.51 - 0.95 mg/dL 0.71   GFR Estimate >60 mL/min/1.73m2 >90   Calcium 8.6 - 10.0 mg/dL 9.3   Anion Gap 7 - 15 mmol/L 10   Phosphorus 2.5 - 4.5 mg/dL 3.1   Albumin 3.5 - 5.2 g/dL 4.2   Protein Total 6.4 - 8.3 g/dL 6.7   Alkaline Phosphatase 35 - 104 U/L 72   ALT 10 - 35 U/L 14   AST 10 - 35 U/L 18   Bilirubin Direct 0.00 - 0.30 mg/dL <0.20   Bilirubin Total <=1.2 mg/dL 0.3   Copper 80.0 - 155.0 ug/dL 124.0   Ferritin 6 - 175 ng/mL 23   Glucose 70 - 99 mg/dL 126 (H)   TSH 0.30 - 4.20 uIU/mL 0.44   Vitamin B12 232 - 1,245 pg/mL [address] [place] [address] Reference Range & Units 12/09/22 16:33   WBC 4.0 - 11.0 10e3/uL 8.6   Hemoglobin 11.7 - 15.7 g/dL 12.0   Hematocrit 35.0 - 47.0 % 36.1   Platelet Count 150 - 450 10e3/uL 302   RBC Count 3.80 - 5.20 10e6/uL 4.24   MCV 78 - 100 fL 85   MCH 26.5 - 33.0 pg 28.3   MCHC 31.5 - 36.5 g/dL 33.2   RDW 10.0 - 15.0 % 13.9   % Neutrophils % 63   % Lymphocytes % 22   % Monocytes % 6   % Eosinophils % 8   % Basophils % 1   Absolute Basophils 0.0 - 0.2 10e3/uL 0.1   Absolute Eosinophils 0.0 - 0.7 10e3/uL 0.7   Absolute Immature Granulocytes <=0.4 10e3/uL 0.0   Absolute Lymphocytes 0.8 - 5.3 10e3/uL 1.9   Absolute Monocytes 0.0 - 1.3 10e3/uL 0.5   % Immature Granulocytes % 0   Absolute Neutrophils 1.6 - 8.3 10e3/uL 5.5   Absolute NRBCs 10e3/uL 0.0   NRBCs per 100 WBC <1 /100 0   % Retic 0.5 - 2.0 % 0.9   Absolute Retic 0.025 - 0.095 10e6/uL 0.037      Latest Reference Range & Units 12/09/22 16:33   Ceruloplasmin 20 - 60 mg/dL 29   Parathyroid Hormone Intact 15 - 65 pg/mL 48     CA 15-3 Boyd <8. wnl  CEA: 2.2. wnl    Movement, Autoimm/Paraneo, S     Amphiphysin Ab, S                 Negative          titer   <1:240       AGNA-1, S                         Negative          titer   <1:240        OCTAVIO-1, S                         Negative          titer   <1:240       Reflex Added                      None.                                  OCTAVIO-2, S                         Negative          titer   <1:240       OCTAVIO-3, S                         Negative          titer   <1:240       CASPR2-IgG CBA, S                 Negative                  Negative      CRMP-5-IgG, S                     Negative          titer   <1:240       CRMP-5-IgG Western Blot, S        Negative                  Negative     DPPX Ab IFA, S                    Negative                  Negative     GAD65 Ab Assay, S                 0.00              nmol/L  <= 0.02      GRAF1 IFA, S                      Negative                  Negative     IgLON5 IFA, S                     Negative                  Negative     ITPR1 IFA, S                      Negative                  Negative     KLHL11 Ab CBA, S                  Negative                  Negative     LGI1-IgG CBA, S                   Negative                  Negative     mGluR1 Ab IFA, S                  Negative                  Negative     NIF IFA, S                        Negative                  Negative     NMDA-R Ab CBA, S                  Negative                  Negative     P/Q-Type Calcium Channel Ab       0.11         H    nmol/L  <=0.02  * This profile, in the proper       clinical context, would support an autoimmune movement       disorder, such as ataxia. * The positive predictive value       for an autoimmune neurological diagnosis among patients       with positive values for P/Q -type calcium channel       antibodies 0.10-0.99 nM is 24%. * The positive predictive       value for a cancer diagnosis (diverse types) among patients       positive for P/Q-type calcium channel antibody is 21%;       approximately 18% are historical neoplasms, and 3% are       detected prospectively. * References: Chava NL, Aleks       VA, Clayton DH, Luis CJ, Laura SJ, Kayleigh COTE. P/Q- and       N-type calcium-channel antibodies: Oncological,       neurological, and serological  accompaniments. Muscle Nerve.       2016;54:220-7. *       PCA-1, S                          Negative          titer   <1:240       PCA-2, S                          Negative          titer   <1:240       PCA-Tr, S                         Negative          titer   <1:240       IMAGING:  MRI brain w/wo contrast - personally reviewed: unremarkable  CT chest, abd, pelvis w/ contrast -   Chest: There are no pulmonary nodules. There are no infiltrates or effusions. There is no mediastinal or hilar adenopathy. The heart and vascular structures are within normal limits. There is no axillary or supraclavicular adenopathy.     Abdomen pelvis: The liver, gallbladder, pancreas, spleen are unremarkable. The adrenal glands are symmetric. The kidneys demonstrate symmetric excretion of contrast. There is no hydronephrosis. There is no large or small bowel distention. There is no abdominal or pelvic adenopathy.     There are no skeletal lesions.          ASSESSMENT/PLAN:  Ms. 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  Lab work-up only reviewed a positive P/Q type calcium channel antibody.  This can be associated with malignancy but CT chest, abdomen, and pelvis were negative.  This antibody in isolation is non-specific.  Review of the literature did not reveal any published cases of chorea associated with his antibody alone.  I suspect this in incidental finding.      The etiology of her chorea remains elusive.  She has history of exposure to neuroleptics but reports that this was remote to her symptom development.  Next step is to search for a genetic cause of her dystonia.  For treatment, will increase olanzapine as this has not yet been effective.  She is having increased appetite due to this medication; however VMAT2 inhibitors are very unfavorable given her depression and suicidal ideation.  She is working with a therapist.    - Genetics referral  - Increase  olanzapine to 7.5mg daily  - Ok to discontinue trihexyphenidyl  - RTC 3 months, 30 minutes virtual      Halley Lozoya MD   of Neurology  Movement Disorders Division      41 minutes spent on the date of the encounter doing chart review, history and exam, documentation and further activities as noted above.

## 2023-03-09 ENCOUNTER — MYC MEDICAL ADVICE (OUTPATIENT)
Dept: NEUROLOGY | Facility: CLINIC | Age: 48
End: 2023-03-09
Payer: COMMERCIAL

## 2023-03-16 ENCOUNTER — TELEPHONE (OUTPATIENT)
Dept: NEUROLOGY | Facility: CLINIC | Age: 48
End: 2023-03-16
Payer: COMMERCIAL

## 2023-03-16 NOTE — TELEPHONE ENCOUNTER
[facility] Call Center    Phone Message    May a detailed message be left on voicemail: yes     Reason for Call: Other: Pt is calling because she is wanting to set up a follow up appt with Dr. Lozoya. Writer is unable to schedule due to providers schedule not be available for video. Please call Pt to set up a return visit.    Action Taken: Message routed to:  [facility] Neurology    Travel Screening: Not Applicable

## 2023-03-16 NOTE — TELEPHONE ENCOUNTER
Spoke with patient, scheduled video visit for 6/7 at 4:30pm.     Sivakumar Duong on 3/16/2023 at 4:27 PM

## 2023-03-20 DIAGNOSIS — G25.5 CHOREA: ICD-10-CM

## 2023-03-20 RX ORDER — OLANZAPINE 5 MG/1
TABLET ORAL
Qty: 45 TABLET | Refills: 11 | Status: SHIPPED | OUTPATIENT
Start: 2023-03-20 | End: 2023-05-10

## 2023-03-23 NOTE — TELEPHONE ENCOUNTER
Action 3/23/23 2:49pm   Action Taken Attempted to call Pt to get verbal auth to obtain [facility] recs. No answer, left VM. -QUETA    3/24/23 11:35am Pt returned call and gave verbal consent to access [facility] recs. -QUETA       RECORDS RECEIVED FROM: Care Everywhere   REASON FOR VISIT: Chorea   Date of Appt: 4/21/23   NOTES (FOR ALL VISITS) STATUS DETAILS   OFFICE NOTE from referring provider Internal 3/7/23, 12/9/22 Halley Lozoya MD @ [facility]-Neuro   OFFICE NOTE from other specialist Care Everywhere 3/2/23, 11/1/22, 8/2/22, 6/28/22 Amrita Dowd MD @ [facility]    1/17/23, 10/4/22, 12/7/21 Miguel Ramos MD @ [facility]    8/5/22, 7/8/22, 2/11/22, 1/14/22 Desire Rebollar, APRN, CNP @ [facility]    5/2/22, 3/8/22 Coretta Light PsJared @ [facility] Neuro    4/20/22 Charly Hogan @ [facility]     MEDICATION LIST Internal    IMAGING  (FOR ALL VISITS)     MRI (HEAD, NECK, SPINE) [facility]  1/31/23 MR Brain  2/25/22 MR Brain

## 2023-03-28 ENCOUNTER — MYC MEDICAL ADVICE (OUTPATIENT)
Dept: NEUROLOGY | Facility: CLINIC | Age: 48
End: 2023-03-28
Payer: COMMERCIAL

## 2023-03-28 ENCOUNTER — TELEPHONE (OUTPATIENT)
Dept: NEUROLOGY | Facility: CLINIC | Age: 48
End: 2023-03-28
Payer: COMMERCIAL

## 2023-03-28 DIAGNOSIS — G25.5 CHOREA: Primary | ICD-10-CM

## 2023-03-28 NOTE — CONFIDENTIAL NOTE
OLANZapine (ZYPREXA) 5 MG tablet 45 tablet 11 3/20/2023  No   Sig: Take 1 and 1/2 tab by mouth daily     Jonathan, pharmacist stated they need a prior auth for the above medication.

## 2023-03-28 NOTE — CONFIDENTIAL NOTE
Prior Authorization Retail Medication Request    Medication/Dose: Olanzapine 5MG Tabs  ICD code: G25  Previously Tried and Failed:    Rationale:      Insurance Name: BL[employer]US [employer]  Insurance ID: [id number]       Pharmacy Information   Name: [facility] - [place], [place] - [facility] [address]IN ANDREWS  Phone: [phone]

## 2023-03-30 NOTE — TELEPHONE ENCOUNTER
Central Prior Authorization Team   Phone: [phone]    PA Initiation    Medication: Olanzapine 5MG Tabs  Insurance Company: [employer] - Phone [phone] Fax [phone]  Pharmacy Filling the Rx: [facility] - [place], [place] - [facility] [address]IN ANDREWS  Filling Pharmacy Phone: [phone]  Filling Pharmacy Fax:    Start Date: 3/30/2023               Hide Include Location In Plan Question?: No Detail Level: Detailed Include Location In Plan?: Yes

## 2023-04-04 RX ORDER — OLANZAPINE 7.5 MG/1
7.5 TABLET, FILM COATED ORAL DAILY
Qty: 30 TABLET | Refills: 11 | Status: SHIPPED | OUTPATIENT
Start: 2023-04-04 | End: 2023-05-10

## 2023-04-04 NOTE — TELEPHONE ENCOUNTER
PRIOR AUTHORIZATION DENIED    Medication: Olanzapine 5MG Tabs (QTY LIMIT)-PA DENIED     Denial Date: 3/31/2023    Denial Rational:         Appeal Information:

## 2023-04-21 ENCOUNTER — PRE VISIT (OUTPATIENT)
Dept: NEUROLOGY | Facility: CLINIC | Age: 48
End: 2023-04-21

## 2023-04-21 ENCOUNTER — VIRTUAL VISIT (OUTPATIENT)
Dept: NEUROLOGY | Facility: CLINIC | Age: 48
End: 2023-04-21
Payer: COMMERCIAL

## 2023-04-21 DIAGNOSIS — G25.5 CHOREA: ICD-10-CM

## 2023-04-21 PROCEDURE — 96040 PR GENETIC COUNSELING, EACH 30 MIN: CPT | Mod: VID | Performed by: GENETIC COUNSELOR, MS

## 2023-04-21 NOTE — PROGRESS NOTES
Purvi is a 47 year old who is being evaluated via a billable video visit.      How would you like to obtain your AVS?mychart  If the video visit is dropped, the invitation should be resent by: cell  Will anyone else be joining your video visit? No      Video-Visit Details    Type of service:  Video Visit   Video Start Time: 2:30 PM  Video End Time:3:00 PM    Originating Location (pt. Location): Home    Distant Location (provider location):  Onsite  Platform used for Video Visit: [facility]    GENETIC COUNSELING-Neurology  Purvi Pearce was seen today for genetic consultation. I spent a total of 30 minutes with the patient with the majority of the time being spent on genetic counseling and testing options. The patient was previously seen by our movement disorder specialist, Dr. Halley Lozoya.     MEDICAL HISTORY  Please see Dr. Lozoya's notes for a more thorough summary of medical history. Briefly, Purvi's family first noted the onset of abnormal movements in .  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  Purvi did have a NORMAL genetic test for Tom Green disease (18 and 20 repeats)- which excludes this diagnosis as the cause of her movement disorder.     FAMILY HISTORY-see scanned pedigree  1. Purvi's father  with dementia in his 70's (onset 60's). She does not believe that he had any abnormal movements.  2. No other family history of neurologic disease is reported.     GENETIC COUNSELING  We discussed the genetic and environmental basis of neurologic disease. I explained that in most cases, it results from complex and lifelong interaction of multiple genetic and environmental factors. In some cases, there may be a single gene cause. I explained that the presence of additional family members and/or young ages at diagnosis are typically clues for a single-gene cause.    I explained that for chorea, the most common genetic cause is Tom Green disease. This diagnosis has been excluded in  this case. Once HD has been ruled out, the remaining genetic causes of chorea are individually rare conditions.  Based on the methodologies needed to test for these, I think we could proceed with a two-step testing process:    1. Repeat mediated causes (these cannot be assessed by sequencing, so would exclude them first):   A. SCA17 and DRPLA can be assessed by repeat expansion testing through the [facility]   B. E1hre41 testing can be performed through [facility].   C. Chorea has been reported in very rare cases of Friedreich ataxia- while this is not a likely diagnosis, we can screen for this with a frataxin level. This is important because there is a newly approved disease-modifying treatment (SkyClarys)- so we would not want to miss this diagnosis.   D. HD-like 2 (HDL2) is very unlikely because it is only described so far in patients with sub-[facility]  ancestry.  Therefore, I did not think that this testing would be necessary at this point.    2. Sequencing-based diagnoses  I explained that if the testing for these conditions involves sequencing. While we can evalulate many more genes by these methods, we cannot assess for repeat expansion disorders. Thus, I would consider this as a second step.  If we were to pursue a sequencing-based approach, I might consider whole genome sequencing as there are many genes that can include chorea as part of a complex neurologic phenotype.  If we were to consider a panel, I would think about including STUB1, the gene for benign hereditary chorea, NBIA disorders, several recessive ataxias that can include chorea (CYRUS, AOA2).    We discussed the implications of a positive genetic test. If we identify a clear genetic basis for her chorea, this would provide us with a definitive explanation for her symptoms. In addition, it would allow us to provide accurate risk assessment and/or genetic testing options for family members. I explained that many of the conditions  that we are considering are dominant and could translate into a 50% risk to her children and her half-siblings (depending on which side of the family is at risk).    Purvi indicated that she is very motivated to identify a precise diagnosis. I will place orders to evaluate for the repeat mediated diagnoses (including screening for Friedreich ataxia with frataxin level). We will also draw and hold blood for possible sequencing as a next step.    Vinicio Lopez MS, [facility]  Certified Genetic Counselor          PLAN    Vinicio Lopez MS, [facility]  Certified Genetic Counselor  [facility]

## 2023-04-21 NOTE — LETTER
2023       RE: Purvi Pearce  [address] [place] [id number]     Dear Colleague,    Thank you for referring your patient, Purvi Pearce, to the [facility] NEUROLOGY CLINIC [facility]. Please see a copy of my visit note below.    Purvi is a 47 year old who is being evaluated via a billable video visit.      How would you like to obtain your AVS?mychart  If the video visit is dropped, the invitation should be resent by: cell  Will anyone else be joining your video visit? No    GENETIC COUNSELING-Neurology  Purvi Pearce was seen today for genetic consultation. I spent a total of 30 minutes with the patient with the majority of the time being spent on genetic counseling and testing options. The patient was previously seen by our movement disorder specialist, Dr. Halley Lozoya.     MEDICAL HISTORY  Please see Dr. Lozoya's notes for a more thorough summary of medical history. Briefly, Purvi's family first noted the onset of abnormal movements in .  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  Purvi did have a NORMAL genetic test for [place] disease (18 and 20 repeats)- which excludes this diagnosis as the cause of her movement disorder.     FAMILY HISTORY-see scanned pedigree  1. Purvi's father  with dementia in his 70's (onset 60's). She does not believe that he had any abnormal movements.  2. No other family history of neurologic disease is reported.     GENETIC COUNSELING  We discussed the genetic and environmental basis of neurologic disease. I explained that in most cases, it results from complex and lifelong interaction of multiple genetic and environmental factors. In some cases, there may be a single gene cause. I explained that the presence of additional family members and/or young ages at diagnosis are typically clues for a single-gene cause.    I explained that for chorea, the most common genetic  cause is Claudine disease. This diagnosis has been excluded in this case. Once HD has been ruled out, the remaining genetic causes of chorea are individually rare conditions.  Based on the methodologies needed to test for these, I think we could proceed with a two-step testing process:    1. Repeat mediated causes (these cannot be assessed by sequencing, so would exclude them first):   A. SCA17 and DRPLA can be assessed by repeat expansion testing through the [facility]   B. [id number] testing can be performed through [facility].   C. Chorea has been reported in very rare cases of Friedreich ataxia- while this is not a likely diagnosis, we can screen for this with a frataxin level. This is important because there is a newly approved disease-modifying treatment (SkyClarys)- so we would not want to miss this diagnosis.   D. HD-like 2 (HDL2) is very unlikely because it is only described so far in patients with sub-[facility]  ancestry.  Therefore, I did not think that this testing would be necessary at this point.    2. Sequencing-based diagnoses  I explained that if the testing for these conditions involves sequencing. While we can evalulate many more genes by these methods, we cannot assess for repeat expansion disorders. Thus, I would consider this as a second step.  If we were to pursue a sequencing-based approach, I might consider whole genome sequencing as there are many genes that can include chorea as part of a complex neurologic phenotype.  If we were to consider a panel, I would think about including STUB1, the gene for benign hereditary chorea, NBIA disorders, several recessive ataxias that can include chorea (CYRUS, AOA2).    We discussed the implications of a positive genetic test. If we identify a clear genetic basis for her chorea, this would provide us with a definitive explanation for her symptoms. In addition, it would allow us to provide accurate risk assessment and/or genetic testing  options for family members. I explained that many of the conditions that we are considering are dominant and could translate into a 50% risk to her children and her half-siblings (depending on which side of the family is at risk).    Purvi indicated that she is very motivated to identify a precise diagnosis. I will place orders to evaluate for the repeat mediated diagnoses (including screening for Friedreich ataxia with frataxin level). We will also draw and hold blood for possible sequencing as a next step.         PLAN      Again, thank you for allowing me to participate in the care of your patient.      Sincerely,    Mohamud Lopez, GC

## 2023-05-02 ENCOUNTER — LAB (OUTPATIENT)
Dept: LAB | Facility: CLINIC | Age: 48
End: 2023-05-02
Payer: COMMERCIAL

## 2023-05-02 DIAGNOSIS — C80.1 PARANEOPLASTIC NEUROLOGIC DISORDER (H): ICD-10-CM

## 2023-05-02 DIAGNOSIS — G98.8 PARANEOPLASTIC NEUROLOGIC DISORDER (H): ICD-10-CM

## 2023-05-02 DIAGNOSIS — G25.5 CHOREA: ICD-10-CM

## 2023-05-02 LAB
CANCER AG15-3 SERPL-ACNC: 9 U/ML
CEA SERPL-MCNC: 2.6 NG/ML
INTERPRETATION: NORMAL
LAB PDF RESULT: NORMAL
Lab: NORMAL
Lab: NORMAL
MOLECULAR ADDENDUM: NORMAL
PERFORMING LABORATORY: NORMAL
PERFORMING LABORATORY: NORMAL
SIGNIFICANT RESULTS: NORMAL
SPECIMEN DESCRIPTION: NORMAL
TEST DETAILS, MDL: NORMAL
TEST NAME: NORMAL
TEST NAME: NORMAL

## 2023-05-02 PROCEDURE — 36415 COLL VENOUS BLD VENIPUNCTURE: CPT

## 2023-05-02 PROCEDURE — 86300 IMMUNOASSAY TUMOR CA 15-3: CPT

## 2023-05-02 PROCEDURE — 82378 CARCINOEMBRYONIC ANTIGEN: CPT

## 2023-05-02 PROCEDURE — 99000 SPECIMEN HANDLING OFFICE-LAB: CPT

## 2023-05-19 LAB — MAYO MISC RESULT: NORMAL

## 2023-05-24 ENCOUNTER — TRANSFERRED RECORDS (OUTPATIENT)
Dept: HEALTH INFORMATION MANAGEMENT | Facility: CLINIC | Age: 48
End: 2023-05-24
Payer: COMMERCIAL

## 2023-05-24 ENCOUNTER — DOCUMENTATION ONLY (OUTPATIENT)
Dept: NEUROLOGY | Facility: CLINIC | Age: 48
End: 2023-05-24
Payer: COMMERCIAL

## 2023-05-24 NOTE — PROGRESS NOTES
Received EGG from [facility]   Records Date of service: 5/24/23  Copy has been sent to scanning and encounter routed to specialty nurse for review.

## 2023-05-30 ENCOUNTER — TELEPHONE (OUTPATIENT)
Dept: LAB | Facility: CLINIC | Age: 48
End: 2023-05-30
Payer: COMMERCIAL

## 2023-05-30 LAB — SCANNED LAB RESULT: NORMAL

## 2023-05-30 NOTE — PROGRESS NOTES
GENETIC COUNSELING-Neurology  Sent mychart to Purvi that tests for DRPLA and SCA17 are both normal. The [id number] was previously normal. Still waiting for frataxin level (which I also suspect will be normal). Once these tests are complete, we can discuss whether she would want to proceed with sequencing based tests.    Vinicio Lopez MS, [facility]  Certified Genetic Counselor

## 2023-05-31 LAB — MAYO MISC RESULT: NORMAL

## 2023-06-02 ENCOUNTER — TELEPHONE (OUTPATIENT)
Dept: NEUROLOGY | Facility: CLINIC | Age: 48
End: 2023-06-02
Payer: COMMERCIAL

## 2023-06-02 DIAGNOSIS — G25.5 CHOREA: Primary | ICD-10-CM

## 2023-06-02 NOTE — TELEPHONE ENCOUNTER
GENETIC COUNSELING-Neurology  I spoke with Purvi Pearce about normal genetic testing for repeat mediated causes of chorea. She previously had a negative [place] disease test.  Her current round of testing excluded SCA17, DRPLA, [id number] and Friedreich ataxia. We did not pursue testing for HDL2 as this has only been described in patients with Sub[place]  descent.    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 The next testing option would be to pursue sequence-based testing for genetic causes of chorea including the following genes:ADCY5, CYRUS, ATPX, CP, FOXG1, FTL, GNAO1, GPR88, JAM2, MYORG, NKX2.1, NRROS, PANK2, PDE10A, PDGFB, PDGFRB, PNKD, PRRT2, ORE519, SCN8A, SETX, YGT60E1, STUB1, SYT1, VPS13A, XK, SFQ9CSWG3, CYRUS, ATPX, CP, FOXG1, FTL, GNAO1, GPR88, JAM2, MYORG, NKX2.1, NRROS, PANK2, PDE10A, PDGFB, PDGFRB, PNKD, PRRT2, IHJ800, SCN8A, SETX, UAL61M5, STUB1, SYT1, VPS13A, XK, XPR1    In my previous encounter, we had reviewed the medical necessity of this testing. She is a young woman with a potential neurodegenerative movement disorder and thus identifying a precise genetic basis would be critical in our medical management. In addition, it would have direct bearing on the potential risks to her children.    I will place a prior authorization order for this testing.

## 2023-06-07 ENCOUNTER — VIRTUAL VISIT (OUTPATIENT)
Dept: NEUROLOGY | Facility: CLINIC | Age: 48
End: 2023-06-07
Payer: COMMERCIAL

## 2023-06-07 DIAGNOSIS — G25.5 CHOREA: Primary | ICD-10-CM

## 2023-06-07 DIAGNOSIS — F32.A DEPRESSION, UNSPECIFIED DEPRESSION TYPE: ICD-10-CM

## 2023-06-07 PROCEDURE — 99214 OFFICE O/P EST MOD 30 MIN: CPT | Mod: 95 | Performed by: STUDENT IN AN ORGANIZED HEALTH CARE EDUCATION/TRAINING PROGRAM

## 2023-06-07 RX ORDER — OLANZAPINE 5 MG/1
7.5 TABLET ORAL
COMMUNITY
Start: 2023-03-20 | End: 2023-10-24

## 2023-06-07 NOTE — PROGRESS NOTES
Video-Visit Details    Patient has given verbal consent for Video visit? Yes    Type of service:  Video Visit    Video Start Time: 4:30 PM    Video End Time (time video stopped): 4:43 PM     Duration: 13 min    Originating Location (pt. Location): Home    [facility]     Mode of Communication:  Video Conference via Domee  -----------------------------------------------------------------------------------------------------------------------  Department of Neurology  Movement Disorders Division   Follow-up Note    Patient: Purvi Pearce   MRN: [id number]   : 1975   Date of Visit: 2023    INTERVAL EVENTS:  Purvi Pearce is a 47 year old female who returns to clinic for follow up of chorea.   She was last seen 3/7/2023 at which time olanzapine was increased and trihexyphenidyl was discontinued.  In the interim, olanzapine was transitioned to quetiapine due to weight gain.  She is accompanied by her  who feels that her body chorea has significantly improved.  Denies increased sleepiness or lightheadedness.  No changes to gait or balance. No tremors.  Mood is stable.      Related Medications    Quetiapine 25mg 1   Olanzapine 0.5       ROS:  All others negative except as listed above.      Current Outpatient Medications   Medication Sig Dispense Refill     amphetamine-dextroamphetamine (ADDERALL) 20 MG tablet Take 20 mg by mouth 3 times daily       busPIRone HCl (BUSPAR) 30 MG tablet Take 1 tablet by mouth 2 times daily       cholecalciferol 25 MCG (1000 UT) TABS Take 1,000 Units by mouth daily       doxycycline monohydrate (MONODOX) 100 MG capsule Take 1 capsule by mouth See Admin Instructions       FLUoxetine (PROZAC) 40 MG capsule Take 80 mg by mouth daily       Hypertonic Nasal Wash (SINUS RINSE) bottle Instill 1 Each nasally 2 (two) times a day.       OLANZapine (ZYPREXA) 5 MG tablet Take 7.5 mg by mouth        QUEtiapine (SEROQUEL) 25 MG tablet Take 1 pill daily for 1 week.  Then increase to 2 pills daily if needed. 60 tablet 11     triamcinolone (NASACORT) 55 MCG/ACT nasal aerosol Spray 1 spray into both nostrils twice a day as directed       hydrOXYzine (VISTARIL) 50 MG capsule TAKE 1 TAB BY MOUTH DAILY IN THE MORNING AS NEEDED FOR ANXIETY (Patient not taking: Reported on 6/7/2023)         Allergies   Allergen Reactions     Penicillins Hives          PHYSICAL EXAM:  There were no vitals taken for this visit.  No motor impersistence with tongue protrusion  Orobuccolingual dyskinesias  No truncal or extremity chorea        ASSESSMENT/PLAN:  Purvi Pearce is a 47 year old female who returns to clinic for follow up of chorea. Genetic testing has thus far been unrevealing.  Olanzapine improved her body chorea but caused weight gain so is being transitioned to quetiapine.  Facial movement remain at this time.    - Increase quetiapine as needed   - Consider benzodiazepines next if antipsychotic falls  - RTC 6 months, 30 minutes video      Halley Lozoya MD   of Neurology  Movement Disorders Division

## 2023-06-07 NOTE — LETTER
2023       RE: Purvi Pearce  [address] [place] [id number]     Dear Colleague,    Thank you for referring your patient, Purvi Pearce, to the [facility] NEUROLOGY CLINIC [place] [facility]. Please see a copy of my visit note below.    Video-Visit Details    Patient has given verbal consent for Video visit? Yes    Type of service:  Video Visit    Video Start Time: 4:30 PM    Video End Time (time video stopped): 4:43 PM     Duration: 13 min    Originating Location (pt. Location): Home    Distant Location (provider location):  On-site, [facility] NEUROLOGY CLINIC [place]     Mode of Communication:  Video Conference via MovieLaLa  -----------------------------------------------------------------------------------------------------------------------  Department of Neurology  Movement Disorders Division   Follow-up Note    Patient: Purvi Pearce   MRN: [id number]   : 1975   Date of Visit: 2023    INTERVAL EVENTS:  Purvi Pearce is a 47 year old female who returns to clinic for follow up of chorea.   She was last seen 3/7/2023 at which time olanzapine was increased and trihexyphenidyl was discontinued.  In the interim, olanzapine was transitioned to quetiapine due to weight gain.  She is accompanied by her  who feels that her body chorea has significantly improved.  Denies increased sleepiness or lightheadedness.  No changes to gait or balance. No tremors.  Mood is stable.      Related Medications    Quetiapine 25mg 1   Olanzapine 0.5       ROS:  All others negative except as listed above.      Current Outpatient Medications   Medication Sig Dispense Refill    amphetamine-dextroamphetamine (ADDERALL) 20 MG tablet Take 20 mg by mouth 3 times daily      busPIRone HCl (BUSPAR) 30 MG tablet Take 1 tablet by mouth 2 times daily      cholecalciferol 25 MCG (1000 UT) TABS Take 1,000 Units by mouth daily      doxycycline  monohydrate (MONODOX) 100 MG capsule Take 1 capsule by mouth See Admin Instructions      FLUoxetine (PROZAC) 40 MG capsule Take 80 mg by mouth daily      Hypertonic Nasal Wash (SINUS RINSE) bottle Instill 1 Each nasally 2 (two) times a day.      OLANZapine (ZYPREXA) 5 MG tablet Take 7.5 mg by mouth      QUEtiapine (SEROQUEL) 25 MG tablet Take 1 pill daily for 1 week.  Then increase to 2 pills daily if needed. 60 tablet 11    triamcinolone (NASACORT) 55 MCG/ACT nasal aerosol Spray 1 spray into both nostrils twice a day as directed      hydrOXYzine (VISTARIL) 50 MG capsule TAKE 1 TAB BY MOUTH DAILY IN THE MORNING AS NEEDED FOR ANXIETY (Patient not taking: Reported on 6/7/2023)         Allergies   Allergen Reactions    Penicillins Hives          PHYSICAL EXAM:  There were no vitals taken for this visit.  No motor impersistence with tongue protrusion  Orobuccolingual dyskinesias  No truncal or extremity chorea        ASSESSMENT/PLAN:  Purvi Pearce is a 47 year old female who returns to clinic for follow up of chorea. Genetic testing has thus far been unrevealing.  Olanzapine improved her body chorea but caused weight gain so is being transitioned to quetiapine.  Facial movement remain at this time.    - Increase quetiapine as needed   - Consider benzodiazepines next if antipsychotic falls  - RTC 6 months, 30 minutes video      Halley Lozoya MD   of Neurology  Movement Disorders Division

## 2023-06-27 ENCOUNTER — TELEPHONE (OUTPATIENT)
Dept: LAB | Facility: CLINIC | Age: 48
End: 2023-06-27
Payer: COMMERCIAL

## 2023-06-27 NOTE — PROGRESS NOTES
I called Purvi to update her on insurance coverage for her genetic testing (PA was approved). However, I was unable to reach them. I left a voicemail with my name, phone number and brief reason for calling.    Mimi Plasencia  Genomics Billing    [facility]   [facility]  [address] [place] [id number]  [phone]

## 2023-07-10 ENCOUNTER — DOCUMENTATION ONLY (OUTPATIENT)
Dept: NEUROLOGY | Facility: CLINIC | Age: 48
End: 2023-07-10
Payer: COMMERCIAL

## 2023-07-10 NOTE — PROGRESS NOTES
Received approved letter from [facility]   Records Date of service: 6/21/23  Copy has been sent to scanning and encounter routed to specialty nurse for review.

## 2023-07-20 ENCOUNTER — LAB (OUTPATIENT)
Dept: LAB | Facility: CLINIC | Age: 48
End: 2023-07-20
Payer: COMMERCIAL

## 2023-07-20 DIAGNOSIS — G25.5: Primary | ICD-10-CM

## 2023-07-20 PROCEDURE — 81404 MOPATH PROCEDURE LEVEL 5: CPT | Performed by: GENETIC COUNSELOR, MS

## 2023-07-20 PROCEDURE — 81479 UNLISTED MOLECULAR PATHOLOGY: CPT | Mod: 59 | Performed by: GENETIC COUNSELOR, MS

## 2023-07-20 PROCEDURE — 81479 UNLISTED MOLECULAR PATHOLOGY: CPT | Performed by: GENETIC COUNSELOR, MS

## 2023-07-20 PROCEDURE — 81408 MOPATH PROCEDURE LEVEL 9: CPT | Performed by: GENETIC COUNSELOR, MS

## 2023-07-20 PROCEDURE — 81406 MOPATH PROCEDURE LEVEL 7: CPT | Performed by: GENETIC COUNSELOR, MS

## 2023-07-20 PROCEDURE — G0452 MOLECULAR PATHOLOGY INTERPR: HCPCS | Mod: 26 | Performed by: PATHOLOGY

## 2023-08-13 ENCOUNTER — HEALTH MAINTENANCE LETTER (OUTPATIENT)
Age: 48
End: 2023-08-13

## 2023-10-23 NOTE — PROGRESS NOTES
Department of Neurology  Movement Disorders Division   Follow-up Note    Patient: Purvi Pearce   MRN: [id number]   : 1975   Date of Visit: 10/24/23      INTERVAL EVENTS:  Purvi Pearce is a 47 year old female who returns to clinic for follow up of chorea.   She was last seen 2023 video at which time olanzapine was weaned and quetiapine was up-titrated.      She is accompanied by her  Garfield.    Purvi feels she is doing well overall on quetiapine. The head gets worse when she is stressed, nervous, or tired. The movements do not interfere with functioning or ADLs, but she feels some social embarrassment because of them. She avoids social situations due to the movements. She is in process of appealing the rejection of her disability claim.      Purvi notes she is no longer gaining weight and has lost some.    Denies increased sleepiness or lightheadedness.  No changes to gait or balance. No tremors.  Mood is stable.    She vapes nicotine daily. She smokes marijuana 5 days a week; this sometimes helps with the movements.       Related Medications AM   Quetiapine 25mg 2       ROS:  All others negative except as listed above.      Current Outpatient Medications   Medication Sig Dispense Refill    ADVAIR DISKUS 100-50 MCG/ACT inhaler Inhale 1 puff into the lungs 2 times daily      amphetamine-dextroamphetamine (ADDERALL) 20 MG tablet Take 20 mg by mouth 3 times daily      busPIRone HCl (BUSPAR) 30 MG tablet Take 1 tablet by mouth 2 times daily      cholecalciferol 25 MCG (1000 UT) TABS Take 1,000 Units by mouth daily      ferrous sulfate (FEROSUL) 325 (65 Fe) MG tablet Take 325 mg by mouth daily (with breakfast)      Hypertonic Nasal Wash (SINUS RINSE) bottle Instill 1 Each nasally 2 (two) times a day.      QUEtiapine (SEROQUEL) 25 MG tablet Take 1 pill daily for 1 week.  Then increase to 2 pills daily if needed. 60 tablet 11    triamcinolone (NASACORT) 55 MCG/ACT nasal aerosol Spray 1 spray into both  nostrils twice a day as directed      VENTOLIN  (90 Base) MCG/ACT inhaler Inhale 1 puff into the lungs every 4 hours as needed      vitamin C (ASCORBIC ACID) 500 MG tablet Take 100 mg by mouth daily         Allergies   Allergen Reactions    Penicillins Hives          PHYSICAL EXAM:  /86   Pulse 97   Wt 86.6 kg (191 lb)   SpO2 100%   No motor impersistence with tongue protrusion  Orobuccolingual dyskinesias that worsen with distraction and walking  No truncal chorea, some occasional hand movements that may be fidgeting vs chorea  Normal stance and stride, tandem gait intact        ASSESSMENT/PLAN:  Purvi Pearce is a 47 year old female who returns to clinic for follow up of chorea. Genetic testing has thus far been unrevealing.  Olanzapine improved her body chorea but caused weight gain so was transitioned to quetiapine, which has been effective.      She is doing well on her current dose of quetiapine with minimal side effects. There is residual chorea that she is a bit bothered by, so we will opt to add a dose in the evening. In particular it interferes with sleep.  She was counseled to watch for sleepiness and further weight issues.     - Increase quetiapine: 50mg AM/25mg PM   - Can increase to 50mg BID if symptoms remain poorly controlled  - RTC 3 months, 30 minutes video    Mauricio Espino MD  Fellow  Movement Disorders Division  [facility] Department of Neurology    Neurology Attending Attestation:   I personally saw this patient with our fellow and agree with the their findings and plan of care as documented in the note. I personally performed salient aspects of the history and neurological examination.  I personally reviewed the vital signs and medications.    Halley Lozoya MD   of Neurology  Movement Disorders Division

## 2023-10-24 ENCOUNTER — OFFICE VISIT (OUTPATIENT)
Dept: NEUROLOGY | Facility: CLINIC | Age: 48
End: 2023-10-24
Payer: COMMERCIAL

## 2023-10-24 VITALS
DIASTOLIC BLOOD PRESSURE: 86 MMHG | HEART RATE: 97 BPM | WEIGHT: 191 LBS | SYSTOLIC BLOOD PRESSURE: 120 MMHG | OXYGEN SATURATION: 100 %

## 2023-10-24 DIAGNOSIS — G25.5 CHOREA: Primary | ICD-10-CM

## 2023-10-24 PROCEDURE — 99214 OFFICE O/P EST MOD 30 MIN: CPT | Mod: GC | Performed by: STUDENT IN AN ORGANIZED HEALTH CARE EDUCATION/TRAINING PROGRAM

## 2023-10-24 RX ORDER — CEPHALEXIN 250 MG/1
1 CAPSULE ORAL 2 TIMES DAILY
COMMUNITY
Start: 2023-10-13

## 2023-10-24 RX ORDER — FERROUS SULFATE 325(65) MG
325 TABLET ORAL
COMMUNITY
Start: 2023-09-12

## 2023-10-24 RX ORDER — ASCORBIC ACID 500 MG
100 TABLET ORAL DAILY
COMMUNITY
Start: 2023-09-12

## 2023-10-24 RX ORDER — QUETIAPINE FUMARATE 25 MG/1
TABLET, FILM COATED ORAL
Qty: 90 TABLET | Refills: 11 | Status: SHIPPED | OUTPATIENT
Start: 2023-10-24 | End: 2024-02-13

## 2023-10-24 RX ORDER — ALBUTEROL SULFATE 90 UG/1
1 AEROSOL, METERED RESPIRATORY (INHALATION) EVERY 4 HOURS PRN
COMMUNITY
Start: 2023-10-13

## 2023-10-24 NOTE — LETTER
10/24/2023       RE: Purvi Pearce  [address] [facility] [address] [facility] [address]     Dear Colleague,    Thank you for referring your patient, Purvi Pearce, to the [facility] NEUROLOGY CLINIC [place] [facility]. Please see a copy of my visit note below.    Department of Neurology  Movement Disorders Division   Follow-up Note    Patient: Purvi Pearce   MRN: [id number]   : 1975   Date of Visit: 10/24/23      INTERVAL EVENTS:  Purvi Pearce is a 47 year old female who returns to clinic for follow up of chorea.   She was last seen 2023 video at which time olanzapine was weaned and quetiapine was up-titrated.      She is accompanied by her  Garfield.    Purvi feels she is doing well overall on quetiapine. The head gets worse when she is stressed, nervous, or tired. The movements do not interfere with functioning or ADLs, but she feels some social embarrassment because of them. She avoids social situations due to the movements. She is in process of appealing the rejection of her disability claim.      Purvi notes she is no longer gaining weight and has lost some.    Denies increased sleepiness or lightheadedness.  No changes to gait or balance. No tremors.  Mood is stable.    She vapes nicotine daily. She smokes marijuana 5 days a week; this sometimes helps with the movements.       Related Medications AM   Quetiapine 25mg 2       ROS:  All others negative except as listed above.      Current Outpatient Medications   Medication Sig Dispense Refill    ADVAIR DISKUS 100-50 MCG/ACT inhaler Inhale 1 puff into the lungs 2 times daily      amphetamine-dextroamphetamine (ADDERALL) 20 MG tablet Take 20 mg by mouth 3 times daily      busPIRone HCl (BUSPAR) 30 MG tablet Take 1 tablet by mouth 2 times daily      cholecalciferol 25 MCG (1000 UT) TABS Take 1,000 Units by mouth daily      ferrous sulfate (FEROSUL) 325 (65 Fe) MG tablet Take 325 mg by mouth daily (with  breakfast)      Hypertonic Nasal Wash (SINUS RINSE) bottle Instill 1 Each nasally 2 (two) times a day.      QUEtiapine (SEROQUEL) 25 MG tablet Take 1 pill daily for 1 week.  Then increase to 2 pills daily if needed. 60 tablet 11    triamcinolone (NASACORT) 55 MCG/ACT nasal aerosol Spray 1 spray into both nostrils twice a day as directed      VENTOLIN  (90 Base) MCG/ACT inhaler Inhale 1 puff into the lungs every 4 hours as needed      vitamin C (ASCORBIC ACID) 500 MG tablet Take 100 mg by mouth daily         Allergies   Allergen Reactions    Penicillins Hives          PHYSICAL EXAM:  /86   Pulse 97   Wt 86.6 kg (191 lb)   SpO2 100%   No motor impersistence with tongue protrusion  Orobuccolingual dyskinesias that worsen with distraction and walking  No truncal chorea, some occasional hand movements that may be fidgeting vs chorea  Normal stance and stride, tandem gait intact        ASSESSMENT/PLAN:  Purvi Pearce is a 47 year old female who returns to clinic for follow up of chorea. Genetic testing has thus far been unrevealing.  Olanzapine improved her body chorea but caused weight gain so was transitioned to quetiapine, which has been effective.      She is doing well on her current dose of quetiapine with minimal side effects. There is residual chorea that she is a bit bothered by, so we will opt to add a dose in the evening. In particular it interferes with sleep.  She was counseled to watch for sleepiness and further weight issues.     - Increase quetiapine: 50mg AM/25mg PM   - Can increase to 50mg BID if symptoms remain poorly controlled  - RTC 3 months, 30 minutes video    Mauricio Espino MD  Fellow  Movement Disorders Division  [facility] Department of Neurology    Neurology Attending Attestation:   I personally saw this patient with our fellow and agree with the their findings and plan of care as documented in the note. I personally performed salient aspects of the history and neurological  examination.  I personally reviewed the vital signs and medications.        Again, thank you for allowing me to participate in the care of your patient.      Sincerely,    Halley Lozoya MD

## 2023-10-24 NOTE — PATIENT INSTRUCTIONS
You are doing well on the current dose of quetiapine, but we may be able to do a bit better.     Quetiapine: take 50mg (2 pills) in the morning and 25mg (1 pill) at night.

## 2024-01-22 NOTE — PROGRESS NOTES
Video-Visit Details    Patient has given verbal consent for Video visit? Yes    Type of service:  Video Visit    Video Start Time: 8:44 AM     Video End Time (time video stopped): 8:57 AM     Duration: 13 min    Originating Location (pt. Location): Home    [facility]     Mode of Communication:  Video Conference via Mobbles  -----------------------------------------------------------------------------------------------------------------------  Department of Neurology  Movement Disorders Division   Telemedicine Follow-up Note    Patient: Purvi Pearce   MRN: [id number]   : 1975   Date of Visit: 2024      INTERVAL EVENTS:  Purvi Pearce is a 47 year old female who returns to clinic for follow up of chorea.  She was last seen 10/24/2023 at which time quetiapine was up-titrated.    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  If she tries to be still, this takes a lot of mental effort.  When she is focused on another task, her movements are more severe.  Present in her jaw and head.  The quetiapine is helpful at night.  If she misses a dose, she can't sleep.  The morning dose is causing some sleepiness.    Her mood is improved since returning to work but varies by day.  She works with a psychiatrist and psychologist.      Related Medications AM PM   Quetiapine 25mg 2 2       ROS:  All others negative except as listed above.      Current Outpatient Medications   Medication Sig Dispense Refill    ADVAIR DISKUS 100-50 MCG/ACT inhaler Inhale 1 puff into the lungs 2 times daily      amphetamine-dextroamphetamine (ADDERALL) 20 MG tablet Take 20 mg by mouth 3 times daily      busPIRone HCl (BUSPAR) 30 MG tablet Take 1 tablet by mouth 2 times daily      cholecalciferol 25 MCG (1000 UT) TABS Take 1,000 Units by mouth daily      ferrous sulfate (FEROSUL) 325 (65 Fe) MG tablet Take 325 mg by mouth daily (with breakfast)    "   Hypertonic Nasal Wash (SINUS RINSE) bottle Instill 1 Each nasally 2 (two) times a day.      QUEtiapine (SEROQUEL) 25 MG tablet Take 2 tablets (50 mg) by mouth daily AND 1 tablet (25 mg) at bedtime. Take 1 pill daily for 1 week.  Then increase to 2 pills daily if needed. 90 tablet 11    triamcinolone (NASACORT) 55 MCG/ACT nasal aerosol Spray 1 spray into both nostrils twice a day as directed      VENTOLIN  (90 Base) MCG/ACT inhaler Inhale 1 puff into the lungs every 4 hours as needed      vitamin C (ASCORBIC ACID) 500 MG tablet Take 100 mg by mouth daily         Allergies   Allergen Reactions    Penicillins Hives          PHYSICAL EXAM:  Ht 1.676 m (5' 6\")   Wt 83 kg (183 lb)   BMI 29.54 kg/m    Orobuccolingual dyskinesias  No truncal chorea observed  Qtc 438      ASSESSMENT/PLAN:  Ms. Pearce is a 47 yo woman who returns to clinic for follow up of chorea.  She has residual chorea and quetiapine is causing daytime sleepiness.  We discussed the potential for worsened depression with VMAT2 inhibitors.  She is established with a  therapist and psychiatrist so elected to try a low dose with close surveillance of her mood.    - Start Ingrezza 40mg daily, sent to insurance for prior auth (if not approved, use PAP)  - Continue quetiapine 50mg BID, plan to wean if VMAT2 inhibitor is effective  - RTC 2 months, 30 minutes video    Halley Lozoya MD   of Neurology  Movement Disorders Division  "

## 2024-01-23 ENCOUNTER — VIRTUAL VISIT (OUTPATIENT)
Dept: NEUROLOGY | Facility: CLINIC | Age: 49
End: 2024-01-23
Payer: COMMERCIAL

## 2024-01-23 ENCOUNTER — TELEPHONE (OUTPATIENT)
Dept: NEUROLOGY | Facility: CLINIC | Age: 49
End: 2024-01-23

## 2024-01-23 VITALS — HEIGHT: 66 IN | WEIGHT: 183 LBS | BODY MASS INDEX: 29.41 KG/M2

## 2024-01-23 DIAGNOSIS — G25.5 CHOREA: Primary | ICD-10-CM

## 2024-01-23 PROCEDURE — 99214 OFFICE O/P EST MOD 30 MIN: CPT | Mod: 95 | Performed by: STUDENT IN AN ORGANIZED HEALTH CARE EDUCATION/TRAINING PROGRAM

## 2024-01-23 ASSESSMENT — PAIN SCALES - GENERAL: PAINLEVEL: NO PAIN (0)

## 2024-01-23 ASSESSMENT — PATIENT HEALTH QUESTIONNAIRE - PHQ9: SUM OF ALL RESPONSES TO PHQ QUESTIONS 1-9: 13

## 2024-01-23 NOTE — PATIENT INSTRUCTIONS
Keep taking quetiapine.    We'll also start low dose Ingrezza.  Watch for any changes in your mood and let me know if this happens.  I'll work with your insurance for approval.

## 2024-01-23 NOTE — NURSING NOTE
Is the patient currently in the state of [place]? YES    Visit mode:VIDEO    If the visit is dropped, the patient can be reconnected by: VIDEO VISIT: Text to cell phone:   Telephone Information:   Mobile [phone]       Will anyone else be joining the visit? NO  (If patient encounters technical issues they should call [phone])    How would you like to obtain your AVS? MyChart    Are changes needed to the allergy or medication list? No    Reason for visit: Follow Up    Stephania Burroughs VVF    Depression Response    Patient completed the PHQ-9 assessment for depression and scored >9? Yes  Question 9 on the PHQ-9 was positive for suicidality? No  Does patient have current mental health provider? Yes    Is this a virtual visit? Yes   Does patient have suicidal ideation (positive question 9)? No - offer to place Mental Health Referral.  Patient declined referral/not needed    I personally notified the following: visit provider

## 2024-01-23 NOTE — LETTER
2024       RE: Purvi Pearce  [address] [place] [id number]       Dear Colleague,    Thank you for referring your patient, Purvi Pearce, to the [facility]. Please see a copy of my visit note below.    -----------------------------------------------------------------------------------------------------------------------  Department of Neurology  Movement Disorders Division   Telemedicine Follow-up Note    Patient: Purvi Pearce   MRN: [id number]   : 1975   Date of Visit: 2024      INTERVAL EVENTS:  Purvi Pearce is a 47 year old female who returns to clinic for follow up of chorea.  She was last seen 10/24/2023 at which time quetiapine was up-titrated.    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  If she tries to be still, this takes a lot of mental effort.  When she is focused on another task, her movements are more severe.  Present in her jaw and head.  The quetiapine is helpful at night.  If she misses a dose, she can't sleep.  The morning dose is causing some sleepiness.    Her mood is improved since returning to work but varies by day.  She works with a psychiatrist and psychologist.      Related Medications AM PM   Quetiapine 25mg 2 2       ROS:  All others negative except as listed above.      Current Outpatient Medications   Medication Sig Dispense Refill    ADVAIR DISKUS 100-50 MCG/ACT inhaler Inhale 1 puff into the lungs 2 times daily      amphetamine-dextroamphetamine (ADDERALL) 20 MG tablet Take 20 mg by mouth 3 times daily      busPIRone HCl (BUSPAR) 30 MG tablet Take 1 tablet by mouth 2 times daily      cholecalciferol 25 MCG (1000 UT) TABS Take 1,000 Units by mouth daily      ferrous sulfate (FEROSUL) 325 (65 Fe) MG tablet Take 325 mg by mouth daily (with breakfast)      Hypertonic Nasal Wash (SINUS RINSE) bottle Instill 1 Each nasally 2 (two) times a day.       "QUEtiapine (SEROQUEL) 25 MG tablet Take 2 tablets (50 mg) by mouth daily AND 1 tablet (25 mg) at bedtime. Take 1 pill daily for 1 week.  Then increase to 2 pills daily if needed. 90 tablet 11    triamcinolone (NASACORT) 55 MCG/ACT nasal aerosol Spray 1 spray into both nostrils twice a day as directed      VENTOLIN  (90 Base) MCG/ACT inhaler Inhale 1 puff into the lungs every 4 hours as needed      vitamin C (ASCORBIC ACID) 500 MG tablet Take 100 mg by mouth daily         Allergies   Allergen Reactions    Penicillins Hives          PHYSICAL EXAM:  Ht 1.676 m (5' 6\")   Wt 83 kg (183 lb)   BMI 29.54 kg/m    Orobuccolingual dyskinesias  No truncal chorea observed  Qtc 438      ASSESSMENT/PLAN:  Ms. Pearce is a 47 yo woman who returns to clinic for follow up of chorea.  She has residual chorea and quetiapine is causing daytime sleepiness.  We discussed the potential for worsened depression with VMAT2 inhibitors.  She is established with a  therapist and psychiatrist so elected to try a low dose with close surveillance of her mood.    - Start Ingrezza 40mg daily, sent to insurance for prior auth (if not approved, use PAP)  - Continue quetiapine 50mg BID, plan to wean if VMAT2 inhibitor is effective  - RTC 2 months, 30 minutes video          Again, thank you for allowing me to participate in the care of your patient.      Sincerely,    Halley Lozoya MD    "

## 2024-01-23 NOTE — TELEPHONE ENCOUNTER
PA Initiation    Medication: INGREZZA 40 MG PO CAPS  Insurance Company: [employer]AP - Phone [phone] Fax [phone]  Pharmacy Filling the Rx: [facility] - [place], [place] - [address]  Filling Pharmacy Phone:    Filling Pharmacy Fax:    Start Date: 1/23/2024        Thank you,    Katarina Roy h-T  Specialty Pharmacy Clinic Liaison - CardiologyNeurologyMultiple [facility] [address]  [address] [place] [address]  Ph: [phone] Fax: [phone]  [email or web address]

## 2024-01-23 NOTE — TELEPHONE ENCOUNTER
Left Voicemail (1st Attempt) and Sent Mychart (1st Attempt) for the patient to call back and schedule the following:    Appointment type: Return Movement Disorder, virtual  Provider: Darell  Return date: 3/12/24 or near  Specialty phone number: [phone]  Additional appointment(s) needed: No  Additonal Notes: 7wk video follow up    Isadora [place] on 1/23/2024 at 4:23 PM

## 2024-01-23 NOTE — LETTER
February 13, 2024          To whom it may concern:    I am writing this letter to appeal the denial of coverage for INGREZZA (valbenazine) capsules on behalf of my patient, Ms. Purvi Pearce, who has a diagnosis of chorea.  This letter provides information about the patient's medical history, diagnosis, and treatment plan.    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 Evaluation with brain MRI, CT of the chest, abdomen, and pelvis, [place]'s gene testing, complete blood count with peripheral smear, complete metabolic panel, antinuclear antibody, copper, ceruloplasmin, ferritin, autoimmune movement disorder panel, paraneoplastic antibodies, parathyroid hormone, vitamin B12, thyroid stimulating hormone, SCA17, DRPLA, T2szc16, ADCY5, CYRUS, APTX, CP, FOXG1, FTL, GNAO1, GPR88, JAM2, MYORG, NKX2.1, NRROS, PANK2, PDE10A, PDGFB, PDGFRB, PNKD, PRRT2, PZC120, SCN8A, SETX, VJE86I7, STUB1, SYT1, VPS13A, XK, XPR1, and Friedreich ataxia gene testing has been unrevealing for a cause of her chorea.    Treatment with olanzapine and quetiapine has been complicated by weight gain, excessive daytime sleepiness, and partial but incomplete response.  The next step in management is treatment with a VMAT2 inhibitor such as INGREZZA.  This medication is approved by the [place] Food and Drug Administration for use in chorea associated with [place]'s disease and has been effective for suppressing chorea of other etiologies.    In summary, based on my clinical opinion, INGREZZA is medically necessary and reasonable for Ms. Pearce's condition.  I trust that the information provided, along with my medical recommendations, will establish the medical necessity of coverage for INGREZZA.      Please contact my office if I can provide you with any additional information to approve this request.    Sincerely,          Halley Lozoya MD   of Neurology  Movement Disorders Division

## 2024-01-25 NOTE — TELEPHONE ENCOUNTER
Patient Contacted to schedule the following:    Appointment type: Return Movement Disorder, virtual  Provider: Darell  Return date: 3/12/24 or near  Specialty phone number: [phone]  Additional appointment(s) needed: No  Additonal Notes: 7wk video follow up    Spoke with patient, scheduled on 3/12.    Sivakumar Duong on 1/25/2024 at 3:35 PM

## 2024-01-29 NOTE — TELEPHONE ENCOUNTER
PRIOR AUTHORIZATION DENIED    Medication: INGREZZA 40 MG PO CAPS  Insurance Company: [employer] PMAP - Phone [phone] Fax [phone]  Denial Date: 1/24/2024  Denial Reason(s): Only approved for FDA approved conditions Tardive Dyskinesia  Appeal Information:     Patient Notified: Yes

## 2024-02-13 DIAGNOSIS — G25.5 CHOREA: ICD-10-CM

## 2024-02-13 RX ORDER — QUETIAPINE FUMARATE 50 MG/1
50 TABLET, FILM COATED ORAL 2 TIMES DAILY
Qty: 60 TABLET | Refills: 11 | Status: SHIPPED | OUTPATIENT
Start: 2024-02-13

## 2024-02-13 NOTE — TELEPHONE ENCOUNTER
[facility] Call Center    Phone Message    May a detailed message be left on voicemail: yes     Reason for Call: Medication Question or concern regarding medication   Prescription Clarification  Name of Medication: QUEtiapine (SEROQUEL) 25 MG tablet  Prescribing Provider: Halley Lozoya    Pharmacy: [facility] [id number] [address]   What on the order needs clarification? ASAP    Mickey is requesting Dr. Lozoya to send a electronic order change for 50Mg tablets due to insurance only providing 3 tablets per day instead of 4 tablets per day.     With questions please call back to advise at # [phone].      Action Taken: Message routed to:  [facility] Neurology     Travel Screening: Not Applicable

## 2024-02-20 NOTE — TELEPHONE ENCOUNTER
Medication Appeal Initiation    Medication: INGREZZA 40 MG PO CAPS  Appeal Start Date:  2/20/2024  Insurance Company: [employer]  Insurance Phone: [phone]  Insurance Fax: [phone]  Comments:   Appeal letter submitted with clinical notes    Thank you,    Katarina Roy h-T  Specialty Pharmacy Clinic Liaison - CardiologyNeurologyMultiple Sclerosis  [facility] [address]  [address] [place] [address]  Ph: [phone] Fax: [phone]  [email or web address]

## 2024-02-22 ENCOUNTER — TELEPHONE (OUTPATIENT)
Dept: NEUROLOGY | Facility: CLINIC | Age: 49
End: 2024-02-22
Payer: COMMERCIAL

## 2024-02-22 NOTE — CONFIDENTIAL NOTE
Received letter from [facility]  Records Date of service: 2/21/24  Copy has been sent to scanning emailed to provider

## 2024-03-11 NOTE — TELEPHONE ENCOUNTER
MEDICATION APPEAL APPROVED    Medication: INGREZZA 40 MG PO CAPS  Authorization Effective Date: 1/1/2024  Authorization Expiration Date: 2/21/2025  Approved Dose/Quantity: 30 days  Reference #:     Appeal Insurance Company: [employer]  Expected CoPay: $       CoPay Card Available:    Financial Assistance Needed: N/A  Filling Pharmacy: [facility] - [place], [place] - [address]   Patient Notified: Yes  Comments:             Thank you,    Katarina Roy [facility]  Specialty Pharmacy Clinic Liaison - CardiologyNeurologyMultiple Sclerosis  [facility] [address]  [address] [place] [address]  Ph: [phone] Fax: [phone]  [email or web address]

## 2024-10-06 ENCOUNTER — HEALTH MAINTENANCE LETTER (OUTPATIENT)
Age: 49
End: 2024-10-06

## 2025-01-22 ENCOUNTER — TELEPHONE (OUTPATIENT)
Dept: NEUROLOGY | Facility: CLINIC | Age: 50
End: 2025-01-22
Payer: COMMERCIAL

## 2025-01-22 DIAGNOSIS — G25.5 CHOREA: Primary | ICD-10-CM

## 2025-01-22 PROBLEM — F41.9 ANXIETY: Status: ACTIVE | Noted: 2025-01-22

## 2025-01-22 PROBLEM — F32.9 MAJOR DEPRESSION: Status: ACTIVE | Noted: 2025-01-22

## 2025-01-22 NOTE — PROGRESS NOTES
[address] [place] [address]  Mobile Phone  [phone]  Email  [email or web address])    Stan Pearce  Emergency Contact, Spouse    Chart review only       Assessment:  No diagnosis found.    Review of diagnosis        Avoidance of dopamine blockers       Motor complication review       Review of Impulse control disorders       Review of surgical or medication options       Gait/Balance/Falls       Exercise/Therapy performed/offered       Cognitive/Driving       Mood   Depression  Bipoolar  Ocd  Ptsd  anxiety    Hallucinations/delusions       Sleep   Narcolepsy     Bladder/Renal/Prostate/Gyn/Other       GI/Constipation/GERD   obesity    ENDO/Lipid/DM/Bone density/Thyroid      Cardio/heart/Hyper or Hypotensive       Vision/Dry Eyes/Cataracts/Glaucoma/Macular       Heme/Anticoagulation/Antiplatelet/Anemia/Other      ENT/Resp      Skin/Cancer/Seborrhea/other      Musculoskeletal/Pain/Headache  Migraine     Other:      Medications            Advair diskus inhaler 100 50         Amphetamine dextroamphetamin adderall 20        Buspirone buspar 30mg         Cholecalciferol 25mg 1000 Vit D         Ferrous sulfate ferosul 325 65 Fe        Hypertonic nasal wash sinus rinse         Quetiapine seroquel 50mg        Valbenazine ingrezza 40mg         Ventolin hFA 108 inhaler         Vit C ascorbic acid 500mg                                                                                                   Date of Visit: 1/23/2024        INTERVAL EVENTS:  Purvi Pearce is a 47 year old female who returns to clinic for follow up of chorea.  She was last seen 10/24/2023 at which time quetiapine was up-titrated.     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  If she tries to be still, this takes a lot of mental effort.  When she is focused on another task, her movements are more severe.  Present in her jaw and head.  The quetiapine is helpful at night.  If she misses a dose, she can't sleep.  The morning dose is  causing some sleepiness.     Her mood is improved since returning to work but varies by day.  She works with a psychiatrist and psychologist.        Related Medications AM PM   Quetiapine 25mg 2 2       Orobuccolingual dyskinesias  No truncal chorea observed  Qtc 438        ASSESSMENT/PLAN:  Ms. Pearce is a 47 yo woman who returns to clinic for follow up of chorea.  She has residual chorea and quetiapine is causing daytime sleepiness.  We discussed the potential for worsened depression with VMAT2 inhibitors.  She is established with a  therapist and psychiatrist so elected to try a low dose with close surveillance of her mood.     - Start Ingrezza 40mg daily, sent to insurance for prior auth (if not approved, use PAP)  - Continue quetiapine 50mg BID, plan to wean if VMAT2 inhibitor is effective  - RTC 2 months, 30 minutes video     Halley Lozoya MD    10/24/2023    Date of Visit: 10/24/23        INTERVAL EVENTS:  Purvi Pearce is a 47 year old female who returns to clinic for follow up of chorea.   She was last seen 6/7/2023 video at which time olanzapine was weaned and quetiapine was up-titrated.       She is accompanied by her  [place].     Purvi feels she is doing well overall on quetiapine. The head gets worse when she is stressed, nervous, or tired. The movements do not interfere with functioning or ADLs, but she feels some social embarrassment because of them. She avoids social situations due to the movements. She is in process of appealing the rejection of her disability claim.       Purvi notes she is no longer gaining weight and has lost some.     Denies increased sleepiness or lightheadedness.  No changes to gait or balance. No tremors.  Mood is stable.     She vapes nicotine daily. She smokes marijuana 5 days a week; this sometimes helps with the movements.         Related Medications AM   Quetiapine 25mg 2          ASSESSMENT/PLAN:  Purvi Pearce is a 47 year old female who returns to clinic for  follow up of chorea. Genetic testing has thus far been unrevealing.  Olanzapine improved her body chorea but caused weight gain so was transitioned to quetiapine, which has been effective.       She is doing well on her current dose of quetiapine with minimal side effects. There is residual chorea that she is a bit bothered by, so we will opt to add a dose in the evening. In particular it interferes with sleep.  She was counseled to watch for sleepiness and further weight issues.      - Increase quetiapine: 50mg AM/25mg PM              - Can increase to 50mg BID if symptoms remain poorly controlled  - RTC 3 months, 30 minutes video    7/20/2023  Significant Results  TEST REQUESTED:  Chorea panel on genome backbone.  Next generation sequencing and copy number variation analysis of genes listed in 'Background' section below.  RESULTS: INCONCLUSIVE  Pathogenic/Likely Pathogenic Variant(s): None Detected  Variant(s) of Uncertain Significance: One Detected  Interpretation  No pathogenic or likely pathogenic variants were detected in the genes analyzed. Therefore, a genetic cause for this patient's  symptoms was not identified. Of note, a variant of uncertain significance was detected in the PNKD gene. Genetic counseling  regarding these results is recommended.  VARIANTS OF UNCERTAIN SIGNIFICANCE:  Variants of uncertain clinical significance (VUS) are reported below. These variants cannot be definitively categorized as pathogenic  or benign at the present time. Caution should be exercised in ascribing clinical phenotypes to rare variants without additional  supporting evidence as the majority of rare/novel human variation is unlikely to cause Mendelian disease [Gary et al., 2012].  Correlation with clinical phenotype and analysis of other family members might help to clarify the significance of variants listed  below.  PNKD: [id number]; c.323A>G (p.[id number]), Het, Uncertain Significance  The c.323A>G missense  variant in PNKD results in a p.[id number] substitution. In the gnomAD database of approximately 140,000 controls, 51 individuals are heterozygous for this variant. This variant has conflicting interpretations in ClinVar: two laboratories report the variant as benign or likely benign and one laboratory reports it as a variant of uncertain significance. This variant was not identified in a review of relevant medical literature. In silico prediction models estimate this variant to be damaging; however, the accuracy of such models is limited. Due to insufficient data to clearly classify this variant as pathogenic or benign, this variant is categorized as a variant of uncertain significance.  Lab PDF Result  .  Test Details  BACKGROUND:  Chorea Panel: ADCY5, CYRUS, APTX, CP, FOXG1, FTL, GNAO1, GPR88, JAM2, MYORG, NKX2.1, NRROS, PANK2, PDE10A, PDGFB,  PDGFRB, PNKD, PRRT2, JKM220, SCN8A, SETX, DTZ31L1, STUB1, SYT1, VPS13A, XK, XPR1.  Printed: 8/10/2023 4:13 PM  Page: 1 of 4 Purvi Pearce [id number]  F, 1975  [facility]  [address]  [place] [address]  P: [phone]  F: [phone] Laboratory Report Status:  Purvi Pearce [id number]  F, 1975  [facility]  Printed: 8/10/2023 4:13 PM  Page: 2 of 4 Purvi Pearce [id number]  F, 1975  When indicated, EPCAM and GREM1 are analyzed for copy number variants only; the promoter regions of APC, BMPR1A, BRCA1,  BRCA2, MLH1, MSH2, PTEN, SMAD4, and TP53 are analyzed for sequence variants; the promoter regions of APC, BMPR1A, BRCA1,  BRCA2, GREM1, PTEN, and TP53 are analyzed for copy number variants; and MSH2 is analyzed for the exon 1-7 (Vitor) inversion by  PCR analysis using methods described by Hussein et al. [2002].    Visit 6/7/2023  Date of Visit: 6/7/2023     INTERVAL EVENTS:  Purvi Pearce is a 47 year old female who returns to clinic for follow up of chorea.   She was last seen 3/7/2023 at which time olanzapine was  increased and trihexyphenidyl was discontinued.  In the interim, olanzapine was transitioned to quetiapine due to weight gain.  She is accompanied by her  who feels that her body chorea has significantly improved.  Denies increased sleepiness or lightheadedness.  No changes to gait or balance. No tremors.  Mood is stable.        Related Medications     Quetiapine 25mg 1   Olanzapine 0.5       Purvi Pearce is a 47 year old female who returns to clinic for follow up of chorea. Genetic testing has thus far been unrevealing.  Olanzapine improved her body chorea but caused weight gain so is being transitioned to quetiapine.  Facial movement remain at this time.     - Increase quetiapine as needed              - Consider benzodiazepines next if antipsychotic falls  - RTC 6 months, 30 minutes video    Phone Call 6/2/2023  I spoke with Purvi Pearce about normal genetic testing for repeat mediated causes of chorea. She previously had a negative [place] disease test.  Her current round of testing excluded SCA17, DRPLA, L8qnl95 and Friedreich ataxia. We did not pursue testing for HDL2 as this has only been described in patients with Sub-Saharan  descent.     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 The next testing option would be to pursue sequence-based testing for genetic causes of chorea including the following genes:ADCY5, CYRUS, ATPX, CP, FOXG1, FTL, GNAO1, GPR88, JAM2, MYORG, NKX2.1, NRROS, PANK2, PDE10A, PDGFB, PDGFRB, PNKD, PRRT2, ZWK110, SCN8A, SETX, IPX51S0, STUB1, SYT1, VPS13A, XK, VWE6CBNP9, CYRUS, ATPX, CP, FOXG1, FTL, GNAO1, GPR88, JAM2, MYORG, NKX2.1, NRROS, PANK2, PDE10A, PDGFB, PDGFRB, PNKD, PRRT2, MMF191, SCN8A, SETX, BWQ97Z4, STUB1, SYT1, VPS13A, XK, XPR1       Vinicio Lopez visit 4/21/2023  Please see Dr. Lozoya's notes for a more thorough summary of medical history. Briefly, Purvi's family first noted the  onset of abnormal movements in .  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  Purvi did have a NORMAL genetic test for Claudine disease (18 and 20 repeats)- which excludes this diagnosis as the cause of her movement disorder.     FAMILY HISTORY-see scanned pedigree  1. Purvi's father  with dementia in his 70's (onset 60's). She does not believe that he had any abnormal movements.  2. No other family history of neurologic disease is reported.     GENETIC COUNSELING  We discussed the genetic and environmental basis of neurologic disease. I explained that in most cases, it results from complex and lifelong interaction of multiple genetic and environmental factors. In some cases, there may be a single gene cause. I explained that the presence of additional family members and/or young ages at diagnosis are typically clues for a single-gene cause.     I explained that for chorea, the most common genetic cause is Claudine disease. This diagnosis has been excluded in this case. Once HD has been ruled out, the remaining genetic causes of chorea are individually rare conditions.  Based on the methodologies needed to test for these, I think we could proceed with a two-step testing process:     1. Repeat mediated causes (these cannot be assessed by sequencing, so would exclude them first):              A. SCA17 and DRPLA can be assessed by repeat expansion testing through the [facility]              B. M0mxg84 testing can be performed through [facility].              C. Chorea has been reported in very rare cases of Friedreich ataxia- while this is not a likely diagnosis, we can screen for this with a frataxin level. This is important because there is a newly approved disease-modifying treatment (SkyClarys)- so we would not want to miss this diagnosis.              D. HD-like 2 (HDL2) is very unlikely because it is only described so far in patients with sub-[facility]   ancestry.  Therefore, I did not think that this testing would be necessary at this point.      3/2/2023 Dr. Dowd  Date of Service: 3/2/2023  Provider: Amrita Dowd MD, Neurology    OFFICE NOTE    SITE: [facility] NEUROLOGY    SUBJECTIVE: Purvi, 47 years old, was seen in follow-up for chorea. 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 In July 2022, at her suggestion, she had DNA test for Cayey disease on August 5, 2022 which was negative. She also had a neuropsychological evaluation by Dr. Light, neuropsychologist in [place] on May 2, 2022 which showed multifactorial cognitive dysfunction. I referred her to the movement disorder clinic at the [facility]. She saw Dr. Halley Lozoya on December 9, 2022, when she was diagnosed with generalized chorea of unknown etiology. A paraneoplastic panel of December 9, 2022 was positive for P/Q- type calcium channel antibody (0.1). CT scan of the chest, abdomen and pelvis of January 31, 2023 was negative. She will see Dr. Osullivan in follow-up on March 9, 2023. She has no longer been on baclofen. She had no other complaints.     IMPRESSION AND PLAN:   Generalized chorea of still unknown etiology. DNA test for Cayey's disease of August 5, 2022 was negative. P/Q-type calcium channel antibody of December 9, 2022 was positive, but CT scan of the chest, abdomen and pelvis showed no evidence of malignancy. She will see in follow-up with Dr. Osullivan, specialist in movement disorder at the [facility] next week. I therefore have no further recommendation. She will have follow-up with me on an as-needed basis. I advised Purvi that I will be happy to see her in follow-up when she is discharged from the clinic of Dr. Osullivan.     1/17/2023 telemedicine  Miguel Ramos MD - 01/17/2023 3:45 PM CST  Formatting of this note is different from the  original.  This was a virtual visit conducted via video teleconference per patient preference. The patient consented to completion of the visit via virtual technology.       SITE: Provider located at [facility]     SUBJECTIVE: I saw Purvi Pearce in the sleep clinic on 01/17/23 for follow-up of her excessive daytime sleepiness associated with a diagnosis of narcolepsy without cataplexy. I last saw her in October 2022. She had been previously followed at [facility], and was receiving prescriptions for stimulant therapy (Adderall 20 mg 3 times daily at 6 AM, 10 AM, and 2 PM) through them in conjunction with management for other mental health conditions (bipolar disorder), but her stimulant therapy is now managed through sleep clinic.  She completed an overnight polysomnogram and multiple sleep latency tests in 2018. Her overnight polysomnogram was reassuring without evidence for sleep-disordered breathing. Her AHI was 0.2, and O2 desaturations to 91% were seen. She did display some periodic limb movements of sleep and only moderate intermittent snoring. There was no evidence for significant sleep-disordered breathing as a cause for her excessive daytime sleepiness, and multiple sleep latency test was conducted following the overnight sleep study. She had a sleep efficiency of 94% on her overnight polysomnogram and a total sleep time of 590 minutes. Multiple sleep latency tests revealed a mean sleep latency of 0.9 minutes after 5 nap opportunities, with sleep observed in all 5 naps. She also had sleep onset REM periods noted in all 5 nap opportunities as well with a very short REM sleep latency. 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 She had held stimulant medications for 14 days prior to her overnight polysomnogram. She had previously been prescribed  methylphenidate and modafinil. She is currently taking amphetamine-dextroamphetamine (Adderall), which she finds is beneficial at the current dosage and with the current schedule (20 mg 6 AM, 10 AM, 2 PM). On days that she is home and is able to nap, she may not take the last dose. She still finds that she could fall asleep or nap on the medication, but she feels that she is able to maintain alertness for tasks that require her attention/vigilance. She denies limiting side effects.  She is still undergoing evaluation for involuntary movements/chorea. Paraneoplastic antibody panel came back positive for PQ type calcium channel binding antibody. There does not seem to be any improvement in control of her involuntary movements on days that she does not dose stimulant therapy, or upon waking. She consumes caffeine She denies difficulty staying awake during her short commute. She has not previously tried other wake promoting agents, such as armodafinil, Sunosi, or Wakix.  Patient Active Problem List   Diagnosis    OCD (obsessive compulsive disorder)    PTSD (post-traumatic stress disorder)    Migraine without status migrainosus, not intractable, unspecified migraine type    Obesity, Class I, BMI 30.0-34.9 (see actual BMI)    Bipolar disorder, in partial remission, most recent episode depressed (HCC)    Narcolepsy without cataplexy(347.00)     IMPRESSION/PLAN: Purvi Pearce is a 47-year-old woman with hypersomnolence and polysomnographic evidence on MSLT for pathologic sleepiness and sleep onset REM periods in a pattern consistent with narcolepsy without cataplexy (type 2). She would like to continue her current management with Adderall 20 mg, 1 pill 3 times daily at 6 AM, 10 AM, and 2 PM as needed. I do not feel it is likely that her use of stimulant therapy is contributing to her current movement disorder presentation, as there is no fluctuation or dissipation of her movements at times that she does not take the  medication. She indicates adequate supply of Adderall. I can submit updated orders to her pharmacy when needed. She is advised to avoid driving if drowsy. I will plan on followup with her in the sleep clinic in 4-6 months.     Patient Instructions   Continue Adderall 20 mg 2-3 times daily for management of excessive sleepiness associated with narcolepsy diagnosis  Try to ensure adequate sleep duration; there is no surrogate for getting sufficient sleep in order to feel adequately rested  Follow-up in sleep clinic in 4 to 6 months  Good luck with ongoing work-up for your involuntary movements           Date of Visit: 12/9/2022     CC: involuntary movements     HPI:  Ms. Pearce is a 46 yo woman who presents for evaluation of involuntary movements.  Her movements began over 18 months year ago with a gradual onset.  At first, she didn't notice them but her  pointed them out.  Started around her mouth.  She can't suppress them.  Involves her whole body.  At night she has spasms while trying to fall asleep.  Worsened by being let go from her job.  Sometimes she feels an urge to move.  She is able to suppress it temporarily.  She thinks it's worse in the evenings.  She is stumbling more and dropping things.     She is also reporting cognitive changes.  For example, couldn't remember her passwords.  Loses her train of thought easily.  Also calling objects by the wrong name.  Has trouble with concepts of time.  May have interfered with her job.     Endorses dysphagia.     She had COVID in 11/2020.  She was on Abilify and Risperdal previously.  She has also taken antiemetics previously.  Zofran sounded familiar to her.     She took CD/LD but didn't notice any change. She has been on Cogentin for awhile but it is causing dry mouth.     They describe an episode where she was found in the kitchen with her hands full and appeared to be seeing things.  The following night she put a bowl and spoon in the kitchen in the middle  of the night.  This happened last year.  No other clear episodes but he notices objects put away in odd places.     She is working with a psychiatrist every 3 months and a therapist weekly.        Previously on aripiprazole.  Started on CD/LD to rule out dopa-responsive dystonia.  CAG repeat: 18 and 20 (Normal)     Denies family hx of chorea.  Her mother had dementia and  in her early 70s.  She was in a nursing home for about 10 years.    LABS:  HD gene testing - RESULT: CAG repeat: 18 and 20 (Normal)  TSH 0.35 on 2022  Free T4 0.9 on 2022     IMAGING:  MRI brain w/o contrast 2022 - personally reviewed: unremarkable           ASSESSMENT/PLAN:  Ms. Pearce is a 46 yo woman who presents for evaluation of involuntary movements.  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  The etiology of this is unknown but HD has been rule out.     - Lab tests: CBC with smear, RFP, LFTs, INOCENTE, copper, ceruloplasmin, ferritin, autoimmune movement disorder panel, paraneoplastic antibodies, PTH, TSH, vitamin B12  - Consider genetic testing  - Start olanzapine for chorea  - RTC 3 months 30 minutes        Halley Lozoya MD

## 2025-02-05 DIAGNOSIS — G25.5 CHOREA: ICD-10-CM

## 2025-02-06 NOTE — TELEPHONE ENCOUNTER
RX Authorization    Medication: valbenazine (INGREZZA) 40 MG capsule     Date last refill ordered: 1/23/24    Quantity ordered: 30    # refills: 11    Date of last clinic visit with ordering provider: 1/23/24 (Dr. Lozoya)    Date of next clinic visit with ordering provider: 3/17/25 (Dr. Giron)

## 2025-02-15 RX ORDER — CEPHALEXIN 500 MG/1
CAPSULE ORAL
COMMUNITY
Start: 2025-02-01 | End: 2025-02-20

## 2025-02-15 NOTE — PROGRESS NOTES
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   HD ruled out  Additional genetic testing did not show an abnormality.     Review of diagnosis    chorea    Avoidance of dopamine blockers   No clear exposure    Motor complication review   N/a    Review of Impulse control disorders   N/a    Review of surgical or medication options   Reviewed.     Gait/Balance/Falls   No falls  No balance issues.     Exercise/Therapy performed/offered   Works 25-30 hours per week    Cognitive/Driving   Had cognitive testing in [place] with [facility] and went well and better from the prior testing  Had memory problems in the past and and are better.   No problems driving.   Taking narcolepsy medication.     Mood   Depression  Bipolar  Ocd  Ptsd  Anxiety    Been on medication since age 17 yrs  Prozac and ambien  Has been on effexor  Has been on paxil  Has not been on celexa  Has been on wellbutrin  Not clear but may have been on lexapro  Has not been on lithium  Has been on depakote    Mood is stable than it has been for a while.   Getting back to work after movement anxiety have been controlled  Was off for 2 years.     Has a therapist - mj cowart through [employer] [facility] [employer].   Virtual visits now sporadic and met once in past 6 months  Buspar 30mg 2/day  Quetiapine 50mg at night.     Rustad for adderall for narcolepsy     [facility]   Working 25-30 hours   Spouse is working at the [employer] - [employer]    19 years   1st marriage  Together 27 years  2 sons from this relationship.     Distant exposure of meth  No clear antipsychotic exposure.     Hallucinations/delusions   no    Sleep   Brother luna has  narcolepsy  Narcolepsy diagnosed in 2020 with sleep study  Been on adderall 20mg 3/day prescription - takes it 2/day often     Has leg cramping and they may want to move  Taking magnesium   Has sleep issues and is taking seroquel that helps her fall asleep     Bladder/Renal/Prostate/Gyn/Other   Renal stones - one many years ago and one feb 2025  Had contacted doctor as had suspected a UTI and had problems getting in and came in to the ED  Had abdominal pain and to the right low back.   No bladder or unit(s) rinary problems.     GI/Constipation/GERD   Rare GERD - may take tums  No constipation.   No problem with the iron and constipation.   Obesity  Weight gain - wondering if related to ingrezza vs seroquel  But seroquel was helping with sleep    CT ABDOMEN 2/1/2025    FINDINGS:   Liver: Mild hepatomegaly.   Gallbladder and biliary ducts: Normal. No calcified stones. No ductal dilation.   Pancreas: Normal. No ductal dilation.   Spleen: Normal. No splenomegaly.   Adrenal glands: Normal. No mass.   Kidneys and ureters: Mild right hydroureteronephrosis with stranding of the   right periureteral fat. No obstructive nephrolithiasis is appreciated. Suspect   a recently passed stone.   Stomach and bowel: Unremarkable. No obstruction. No mucosal thickening.   Appendix: No evidence of appendicitis.     Intraperitoneal space: Unremarkable. No free air. No significant fluid   collection.   Vasculature: Unremarkable. No abdominal aortic aneurysm.   Lymph nodes: Unremarkable. No enlarged lymph nodes.   Urinary bladder: Mild thickening of the urinary bladder wall with trace   perivesical fat stranding. Correlate with urinalysis.   Reproductive: 2 cm simple appearing left ovarian cyst.   Bones/joints: Mild multilevel degenerative disc disease and facet arthropathy   of the thoracolumbar spine.   Soft tissues: Unremarkable.     IMPRESSION:   Mild right hydroureteronephrosis with stranding of the right periureteral fat.   No  "obstructive nephrolithiasis is appreciated. Suspect a recently passed stone.     Mild thickening of the urinary bladder wall with trace perivesical fat   stranding. Correlate with urinalysis.     ENDO/Lipid/DM/Bone density/Thyroid  Vit D deficiency  Taking vitamin D    No thyroid or cholesterol or diabetes issues  Has not had a bone density    No results found for: \"A1C\"  No results for input(s): \"CHOL\", \"HDL\", \"LDL\", \"TRIG\", \"CHOLHDLRATIO\" in the last 84521 hours.    Cardio/heart/Hyper or Hypotensive   /83 (BP Location: Right arm, Patient Position: Sitting, Cuff Size: Adult Regular)   Pulse 93   Ht 1.676 m (5' 6\")   Wt 93 kg (205 lb)   BMI 33.09 kg/m    No heart issues  Has not passed out.   He episodes where was getting tunnel vision and then went back to bed    Vision/Dry Eyes/Cataracts/Glaucoma/Macular   Wears contacts or glasses.     Heme/Anticoagulation/Antiplatelet/Anemia/Other  Ferrous sulfate every other day   Was weak and fatigued  No bleeding or blood disorders  No miscarriages    Ferritin   Date Value Ref Range Status   12/09/2022 23 6 - 175 ng/mL Final     ENT/Resp  FORMER SMOKER 2016  SMOKED 30 YEARS.   NO LOSS OF SMELL OR TASTE  NO SINUS PROBLEMS.   HAS HAD PNEUMONIA A FEW TIMES.     CHEST XRAY 9/21/2024  Since prior exam, new patchy airspace opacities in the right middle lobe and right hilar fullness, likely infectious in etiology. Subsegmental atelectasis in the right midlung. Remainder is not significantly changed. No pneumothorax or pleural effusion. Normal cardiac silhouette.     Has not had a followup xray.     CT chest abd pelvis 1/31/2023  Chest: There are no pulmonary nodules. There are no infiltrates or effusions. There is no mediastinal or hilar adenopathy. The heart and vascular structures are within normal limits. There is no axillary or supraclavicular adenopathy.     Abdomen pelvis: The liver, gallbladder, pancreas, spleen are unremarkable. The adrenal glands are symmetric. " The kidneys demonstrate symmetric excretion of contrast. There is no hydronephrosis. There is no large or small bowel distention. There is no abdominal or pelvic adenopathy.     There are no skeletal lesions.     IMPRESSION:   1. Negative exam the chest, abdomen, and pelvis.     Skin/Cancer/Seborrhea/other  No cancer  Rosacea     Musculoskeletal/Pain/Headache  Migraine  - none for a few months.   Tunnel vision  With her migraine she has light sensitivity, they are pounding  She may take ibuprofen for them - may help  Has gone to the ED and had toradol.     Other:        Medications             Advair diskus inhaler 100 50  prn       Amphetamine dextroamphetamin adderall 20  1  varies 1    Buspirone buspar 30mg  1     1   Cholecalciferol 25mg 1000 Vit D  1        Ferrous sulfate ferosul 325 65 Fe Q0d        Hypertonic nasal wash sinus rinse  off        Magnesium 400mg or 500mg   1   Quetiapine seroquel 50mg      1   Valbenazine ingrezza 40mg  1        Ventolin hFA 108 inhaler  prn        Vit C ascorbic acid 500mg   q0d                                                  Plan:    Had P/Q antibody abnormality that may have been a spurious finding and may check again to see if abnormality.   She had CT scanning done in the past that was unrevealing for cancer in her chest/abdomen, etc.   She is getting regular mammograms and a normal colonoscopy. She had a recent renal stone and had a ct abdomen done as well had a pneumonia in the fall 2024 and may consider getting a followup imaging from her PCP for this to show the abnormality has cleared. She  is asymptomatic from a lung point of view    Chorea noted and documented.   She has a negra that will cover for the next year for the ingrezza  She has worked with Jennifer Barksdale once.     Return back in 9-12 months.         [email or web address]    0=none; 1=minimal; 2=mild, 3=moderate;  4=severe    AIMS  Facial and Oral movements.   1. Muscle of facial expression (forehead/eyebrows/periorbital/cheeks/blinking/upper face)   3    2. Lips and perioral area (puckering/pouting/smacking)                    2    3. Jaw (biting/clenching/chewing/mouth opening/lateral movement)                                       3    4. Tongue (In and out  Mouth movement)                                                                                 0    Extremity movements  5. Upper (arm/wrists/fingers/chorea/athetoid but not tremor)                                                  0    6. Lower (lateral knee/foot tapping/heel dropping/foot squirming/inversion/eversion)              0    Trunk Neck/shoulder/hips (rocking/twisting/squirming/pelvic/diaphragm)                               0    Global judgments  8. Severity of movements                                                                                                         2  0=none; 1=minimal; 2=mild, 3=moderate; 4=severe                 9. Incapacitation due to abnormal movements                                                                        2  0=none/1=minimal/2=mild/3=mild;4=severe      10. Patient awareness of abnormal movements                                                                     2  0=none/1=aware-no distress/2=aware-mild distress/3=aware-mod distress/4=aware-severe distress    Dental Status  11. Current problems with teeth and/or dentures 0=no; 1 = yes                                             0                                                                                                        12. Does patient usually wear dentures  0=no; 1=yes                                                            0                                                                                                                                  AIMS Score                                                                                                                            14                Coding statement:   Medical Decision Making:  #  Chronic progressive medical conditions addressed  - see above --   Review and/or interpretation of unique test or documentation from a provider outside of neurology yes   Independent historian provided additional details  no I  Prescription drug management and review of potential side effects and/or monitoring for side effects  -- see above ---  Health impacted by social determinants of health  no    I have reviewed the note as documented above.  This accurately captures the substance of my conversation with the patient and total time spent preparing for visit, executing visit and completing visit on the day of the visit:  60 minutes.  The portion of this total time included face to face time     The longitudinal plan of care for Purvi Pearce was addressed during this visit. Due to the added complexity in care, I will continue to support Purvi Pearce in the subsequent management of this condition(s) and with the ongoing continuity of care of this condition(s).      Mitchel Giron MD     ______________________________________    Last visit date and details:     PARANEOPLASTIC PANEL    Result Notes         Component  Ref Range & Units 2 yr ago    Interpretive Comments SEE NOTE   Comment: The following antibody was identified: Calcium Channel  Binding Antibody, P/Q-type. * This profile, in the proper  clinical context, would support neurological autoimmunity.  * The positive predictive value for an autoimmune  neurological diagnosis (Lambert-Eaton syndrome or diverse  encephalomyeloneuropathic phenotypes) among patients with  positive values for P/Q -type calcium channel antibodies  0.10-0.99 nM is 24%. * The positive predictive value for a  cancer diagnosis (diverse types) among patients positive  for P/Q-type calcium channel antibody is 21%; approximately  18% are historical neoplasms, and 3% are  detected  prospectively. * References: Chava NL, Aleks [facility],  Clayton DH, Luis CJ, Laura SJ, Kayleigh A. P/Q- and  N-type calcium-channel antibodies: Oncological,  neurological, and serological accompaniments. Muscle Nerve.  2016;54:220-7. *    Amphiphysin Ab, S  <1:240 titer Negative   Comment:    -------------------ADDITIONAL INFORMATION-------------------  This test was developed and its performance characteristics  determined by [facility] in a manner consistent with CLIA  requirements. This test has not been cleared or approved by  the U.S. Food and Drug Administration.    AGNA-1, S  <1:240 titer Negative   Comment:    -------------------ADDITIONAL INFORMATION-------------------  This test was developed and its performance characteristics  determined by [facility] in a manner consistent with CLIA  requirements. This test has not been cleared or approved by  the U.S. Food and Drug Administration.    OCTAVIO-1, S  <1:240 titer Negative    OCTAVIO-2, S  <1:240 titer Negative   Comment:    -------------------ADDITIONAL INFORMATION-------------------  This test was developed and its performance characteristics  determined by [facility] in a manner consistent with CLIA  requirements. This test has not been cleared or approved by  the U.S. Food and Drug Administration.    OCTAVIO-3, S  <1:240 titer Negative   Comment:    -------------------ADDITIONAL INFORMATION-------------------  This test was developed and its performance characteristics  determined by [facility] in a manner consistent with CLIA  requirements. This test has not been cleared or approved by  the U.S. Food and Drug Administration.    CRMP-5-IgG, S  <1:240 titer Negative   Comment:    -------------------ADDITIONAL INFORMATION-------------------  This test was developed and its performance characteristics  determined by [facility] in a manner consistent with CLIA  requirements. This test has not been cleared or approved by  the U.S. Food and Drug  Administration.    Neuronal (V-G) K+ Channel Ab, S  <=0.02 nmol/L 0.02   Comment:    -------------------ADDITIONAL INFORMATION-------------------  This test was developed and its performance characteristics  determined by [facility] in a manner consistent with CLIA  requirements. This test has not been cleared or approved by  the U.S. Food and Drug Administration.    P/Q-Type Calcium Channel Ab  <=0.02 nmol/L 0.10 High    Comment:    -------------------ADDITIONAL INFORMATION-------------------  This test was developed and its performance characteristics  determined by [facility] in a manner consistent with CLIA  requirements. This test has not been cleared or approved by  the U.S. Food and Drug Administration.    PCA-1, S  <1:240 titer Negative   Comment:    -------------------ADDITIONAL INFORMATION-------------------  This test was developed and its performance characteristics  determined by [facility] in a manner consistent with CLIA  requirements. This test has not been cleared or approved by  the U.S. Food and Drug Administration.    PCA-2, S  <1:240 titer Negative   Comment:    -------------------ADDITIONAL INFORMATION-------------------  This test was developed and its performance characteristics  determined by [facility] in a manner consistent with CLIA  requirements. This test has not been cleared or approved by  the U.S. Food and Drug Administration.    PCA-Tr, S  <1:240 titer Negative   Comment:    -------------------ADDITIONAL INFORMATION-------------------  This test was developed and its performance characteristics  determined by [facility] in a manner consistent with CLIA  requirements. This test has not been cleared or approved by  the U.S. Food and Drug Administration.     Test Performed by:  [facility] - [address] [address]  : Cristiano Hunter M.D. Ph.D.; CLIA# [id number]    Reflex Added None.   Comment:   "  -------------------ADDITIONAL INFORMATION-------------------  This test was developed and its performance characteristics  determined by [facility] in a manner consistent with CLIA  requirements. This test has not been cleared or approved by  the U.S. Food and Drug Administration.        Enter \"miscellaneous lab order\" in Epic  Click \"[facility]\"  Test number: \"SPPS\"  Test description:  \"Stiff-Person Spectrum Disorders Evaluation, including Progressive Encephalomyelitis with Rigidity and Myoclonus, Serum\"    [email or web address]   ??        Date of Visit: 1/23/2024      INTERVAL EVENTS:  Purvi Pearce is a 47 year old female who returns to clinic for follow up of chorea.  She was last seen 10/24/2023 at which time quetiapine was up-titrated.     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  If she tries to be still, this takes a lot of mental effort.  When she is focused on another task, her movements are more severe.  Present in her jaw and head.  The quetiapine is helpful at night.  If she misses a dose, she can't sleep.  The morning dose is causing some sleepiness.     Her mood is improved since returning to work but varies by day.  She works with a psychiatrist and psychologist.      Related Medications AM PM   Quetiapine 25mg 2 2       Orobuccolingual dyskinesias  No truncal chorea observed  Qtc 438        ASSESSMENT/PLAN:  Ms. Pearce is a 49 yo woman who returns to clinic for follow up of chorea.  She has residual chorea and quetiapine is causing daytime sleepiness.  We discussed the potential for worsened depression with VMAT2 inhibitors.  She is established with a  therapist and psychiatrist so elected to try a low dose with close surveillance of her mood.     - Start Ingrezza 40mg daily, sent to insurance for prior auth (if not approved, use PAP)  - Continue quetiapine 50mg BID, plan to wean if VMAT2 inhibitor is effective  - RTC 2 months, 30 minutes " video     Halley Lozoya MD     10/24/2023     Date of Visit: 10/24/23        INTERVAL EVENTS:  Purvi Pearce is a 47 year old female who returns to clinic for follow up of chorea.   She was last seen 6/7/2023 video at which time olanzapine was weaned and quetiapine was up-titrated.       She is accompanied by her  [place].     Purvi feels she is doing well overall on quetiapine. The head gets worse when she is stressed, nervous, or tired. The movements do not interfere with functioning or ADLs, but she feels some social embarrassment because of them. She avoids social situations due to the movements. She is in process of appealing the rejection of her disability claim.       Purvi notes she is no longer gaining weight and has lost some.     Denies increased sleepiness or lightheadedness.  No changes to gait or balance. No tremors.  Mood is stable.     She vapes nicotine daily. She smokes marijuana 5 days a week; this sometimes helps with the movements.         Related Medications AM   Quetiapine 25mg 2          ASSESSMENT/PLAN:  Purvi Pearce is a 47 year old female who returns to clinic for follow up of chorea. Genetic testing has thus far been unrevealing.  Olanzapine improved her body chorea but caused weight gain so was transitioned to quetiapine, which has been effective.       She is doing well on her current dose of quetiapine with minimal side effects. There is residual chorea that she is a bit bothered by, so we will opt to add a dose in the evening. In particular it interferes with sleep.  She was counseled to watch for sleepiness and further weight issues.      - Increase quetiapine: 50mg AM/25mg PM              - Can increase to 50mg BID if symptoms remain poorly controlled  - RTC 3 months, 30 minutes video     7/20/2023  Significant Results  TEST REQUESTED:  Chorea panel on genome backbone.  Next generation sequencing and copy number variation analysis of genes listed in 'Background' section  below.  RESULTS: INCONCLUSIVE  Pathogenic/Likely Pathogenic Variant(s): None Detected  Variant(s) of Uncertain Significance: One Detected  Interpretation  No pathogenic or likely pathogenic variants were detected in the genes analyzed. Therefore, a genetic cause for this patient's  symptoms was not identified. Of note, a variant of uncertain significance was detected in the PNKD gene. Genetic counseling  regarding these results is recommended.  VARIANTS OF UNCERTAIN SIGNIFICANCE:  Variants of uncertain clinical significance (VUS) are reported below. These variants cannot be definitively categorized as pathogenic  or benign at the present time. Caution should be exercised in ascribing clinical phenotypes to rare variants without additional  supporting evidence as the majority of rare/novel human variation is unlikely to cause Mendelian disease [Gary et al., 2012].  Correlation with clinical phenotype and analysis of other family members might help to clarify the significance of variants listed  below.  PNKD: [id number]; c.323A>G (p.Qno186Ssu), Het, Uncertain Significance  The c.323A>G missense variant in PNKD results in a p.Dnx321Bqa substitution. In the gnomAD database of approximately 140,000 controls, 51 individuals are heterozygous for this variant. This variant has conflicting interpretations in ClinVar: two laboratories report the variant as benign or likely benign and one laboratory reports it as a variant of uncertain significance. This variant was not identified in a review of relevant medical literature. In silico prediction models estimate this variant to be damaging; however, the accuracy of such models is limited. Due to insufficient data to clearly classify this variant as pathogenic or benign, this variant is categorized as a variant of uncertain significance.  Lab PDF Result  .  Test Details  BACKGROUND:  Chorea Panel: ADCY5, CYRUS, APTX, CP, FOXG1, FTL, GNAO1, GPR88, JAM2, MYORG, NKX2.1, NRROS,  PANK2, PDE10A, PDGFB,  PDGFRB, PNKD, PRRT2, DYL444, SCN8A, SETX, WWU12O5, STUB1, SYT1, VPS13A, XK, XPR1.  Printed: 8/10/2023 4:13 PM  Page: 1 of 4 Purvi Pearce [id number]  F, 1975  [facility]  [address]  [place] [address]  P: [phone]  F: [phone] Laboratory Report Status:  Purvi Pearce [id number]  F, 1975  [facility]  Printed: 8/10/2023 4:13 PM  Page: 2 of 4 Purvi Pearce [id number]  F, 1975  When indicated, EPCAM and GREM1 are analyzed for copy number variants only; the promoter regions of APC, BMPR1A, BRCA1,  BRCA2, MLH1, MSH2, PTEN, SMAD4, and TP53 are analyzed for sequence variants; the promoter regions of APC, BMPR1A, BRCA1,  BRCA2, GREM1, PTEN, and TP53 are analyzed for copy number variants; and MSH2 is analyzed for the exon 1-7 (Vitor) inversion by  PCR analysis using methods described by Savage et al. [2002].     Visit 6/7/2023  Date of Visit: 6/7/2023     INTERVAL EVENTS:  Purvi Pearce is a 47 year old female who returns to clinic for follow up of chorea.   She was last seen 3/7/2023 at which time olanzapine was increased and trihexyphenidyl was discontinued.  In the interim, olanzapine was transitioned to quetiapine due to weight gain.  She is accompanied by her  who feels that her body chorea has significantly improved.  Denies increased sleepiness or lightheadedness.  No changes to gait or balance. No tremors.  Mood is stable.        Related Medications     Quetiapine 25mg 1   Olanzapine 0.5       Purvi Pearce is a 47 year old female who returns to clinic for follow up of chorea. Genetic testing has thus far been unrevealing.  Olanzapine improved her body chorea but caused weight gain so is being transitioned to quetiapine.  Facial movement remain at this time.     - Increase quetiapine as needed              - Consider benzodiazepines next if antipsychotic falls  - RTC 6 months, 30 minutes video     Phone Call 6/2/2023  I  spoke with Purvi Pearce about normal genetic testing for repeat mediated causes of chorea. She previously had a negative Claudine disease test.  Her current round of testing excluded SCA17, DRPLA, [id number] and Friedreich ataxia. We did not pursue testing for HDL2 as this has only been described in patients with Sub-Saharan  descent.     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 The next testing option would be to pursue sequence-based testing for genetic causes of chorea including the following genes:ADCY5, CYRUS, ATPX, CP, FOXG1, FTL, GNAO1, GPR88, JAM2, MYORG, NKX2.1, NRROS, PANK2, PDE10A, PDGFB, PDGFRB, PNKD, PRRT2, NKR876, SCN8A, SETX, TKO29A0, STUB1, SYT1, VPS13A, XK, NQB4UWAM1, CYRUS, ATPX, CP, FOXG1, FTL, GNAO1, GPR88, JAM2, MYORG, NKX2.1, NRROS, PANK2, PDE10A, PDGFB, PDGFRB, PNKD, PRRT2, UFO841, SCN8A, SETX, SHI48E6, STUB1, SYT1, VPS13A, XK, XPR1        [facility] visit 2023  Please see Dr. Lozoya's notes for a more thorough summary of medical history. Briefly, Purvi's family first noted the onset of abnormal movements in .  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  Purvi did have a NORMAL genetic test for Claudine disease (18 and 20 repeats)- which excludes this diagnosis as the cause of her movement disorder.     FAMILY HISTORY-see scanned pedigree  1. Purvi's father  with dementia in his 70's (onset 60's). She does not believe that he had any abnormal movements.  2. No other family history of neurologic disease is reported.     GENETIC COUNSELING  We discussed the genetic and environmental basis of neurologic disease. I explained that in most cases, it results from complex and lifelong interaction of multiple genetic and environmental factors. In some cases, there may be a single gene cause. I explained that the presence of additional family members and/or young ages  at diagnosis are typically clues for a single-gene cause.     I explained that for chorea, the most common genetic cause is Krakow disease. This diagnosis has been excluded in this case. Once HD has been ruled out, the remaining genetic causes of chorea are individually rare conditions.  Based on the methodologies needed to test for these, I think we could proceed with a two-step testing process:     1. Repeat mediated causes (these cannot be assessed by sequencing, so would exclude them first):              A. SCA17 and DRPLA can be assessed by repeat expansion testing through the [facility]              B. R2woc70 testing can be performed through [facility].              C. Chorea has been reported in very rare cases of Friedreich ataxia- while this is not a likely diagnosis, we can screen for this with a frataxin level. This is important because there is a newly approved disease-modifying treatment (SkyClarys)- so we would not want to miss this diagnosis.              D. HD-like 2 (HDL2) is very unlikely because it is only described so far in patients with sub-[place]  ancestry.  Therefore, I did not think that this testing would be necessary at this point.        3/2/2023 Dr. Dowd  Date of Service: 3/2/2023  Provider: Amrita Dowd MD, Neurology    OFFICE NOTE    SITE: [facility] NEUROLOGY    SUBJECTIVE: Purvi, 47 years old, was seen in follow-up for chorea. 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 In July 2022, at her suggestion, she had DNA test for Krakow disease on August 5, 2022 which was negative. She also had a neuropsychological evaluation by Dr. Light, neuropsychologist in [place] on May 2, 2022 which showed multifactorial cognitive dysfunction. I referred her to the movement disorder clinic at the [facility]. She saw Dr. Halley Lozoya on December 9,  2022, when she was diagnosed with generalized chorea of unknown etiology. A paraneoplastic panel of December 9, 2022 was positive for P/Q- type calcium channel antibody (0.1). CT scan of the chest, abdomen and pelvis of January 31, 2023 was negative. She will see Dr. Osullivan in follow-up on March 9, 2023. She has no longer been on baclofen. She had no other complaints.      IMPRESSION AND PLAN:   Generalized chorea of still unknown etiology. DNA test for Claudine's disease of August 5, 2022 was negative. P/Q-type calcium channel antibody of December 9, 2022 was positive, but CT scan of the chest, abdomen and pelvis showed no evidence of malignancy. She will see in follow-up with Dr. Osullivan, specialist in movement disorder at the [facility] next week. I therefore have no further recommendation. She will have follow-up with me on an as-needed basis. I advised Purvi that I will be happy to see her in follow-up when she is discharged from the clinic of Dr. Osullivan.      1/17/2023 telemedicine  Miguel Ramos MD - 01/17/2023 3:45 PM CST  Formatting of this note is different from the original.  This was a virtual visit conducted via video teleconference per patient preference. The patient consented to completion of the visit via virtual technology.       SITE: Provider located at [facility]     SUBJECTIVE: I saw Purvi Pearce in the sleep clinic on 01/17/23 for follow-up of her excessive daytime sleepiness associated with a diagnosis of narcolepsy without cataplexy. I last saw her in October 2022. She had been previously followed at [facility] and [facility], and was receiving prescriptions for stimulant therapy (Adderall 20 mg 3 times daily at 6 AM, 10 AM, and 2 PM) through them in conjunction with management for other mental health conditions (bipolar disorder), but her stimulant therapy is now managed through sleep clinic.  She completed an overnight polysomnogram and  multiple sleep latency tests in 2018. Her overnight polysomnogram was reassuring without evidence for sleep-disordered breathing. Her AHI was 0.2, and O2 desaturations to 91% were seen. She did display some periodic limb movements of sleep and only moderate intermittent snoring. There was no evidence for significant sleep-disordered breathing as a cause for her excessive daytime sleepiness, and multiple sleep latency test was conducted following the overnight sleep study. She had a sleep efficiency of 94% on her overnight polysomnogram and a total sleep time of 590 minutes. Multiple sleep latency tests revealed a mean sleep latency of 0.9 minutes after 5 nap opportunities, with sleep observed in all 5 naps. She also had sleep onset REM periods noted in all 5 nap opportunities as well with a very short REM sleep latency. 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 She had held stimulant medications for 14 days prior to her overnight polysomnogram. She had previously been prescribed methylphenidate and modafinil. She is currently taking amphetamine-dextroamphetamine (Adderall), which she finds is beneficial at the current dosage and with the current schedule (20 mg 6 AM, 10 AM, 2 PM). On days that she is home and is able to nap, she may not take the last dose. She still finds that she could fall asleep or nap on the medication, but she feels that she is able to maintain alertness for tasks that require her attention/vigilance. She denies limiting side effects.  She is still undergoing evaluation for involuntary movements/chorea. Paraneoplastic antibody panel came back positive for PQ type calcium channel binding antibody. There does not seem to be any improvement in control of her involuntary movements on days that she does not dose stimulant therapy, or upon waking. She consumes caffeine She denies  difficulty staying awake during her short commute. She has not previously tried other wake promoting agents, such as armodafinil, Sunosi, or Wakix.  Patient Active Problem List   Diagnosis    OCD (obsessive compulsive disorder)    PTSD (post-traumatic stress disorder)    Migraine without status migrainosus, not intractable, unspecified migraine type    Obesity, Class I, BMI 30.0-34.9 (see actual BMI)    Bipolar disorder, in partial remission, most recent episode depressed (HCC)    Narcolepsy without cataplexy(347.00)      IMPRESSION/PLAN: Purvi Pearce is a 47-year-old woman with hypersomnolence and polysomnographic evidence on MSLT for pathologic sleepiness and sleep onset REM periods in a pattern consistent with narcolepsy without cataplexy (type 2). She would like to continue her current management with Adderall 20 mg, 1 pill 3 times daily at 6 AM, 10 AM, and 2 PM as needed. I do not feel it is likely that her use of stimulant therapy is contributing to her current movement disorder presentation, as there is no fluctuation or dissipation of her movements at times that she does not take the medication. She indicates adequate supply of Adderall. I can submit updated orders to her pharmacy when needed. She is advised to avoid driving if drowsy. I will plan on followup with her in the sleep clinic in 4-6 months.     Patient Instructions   Continue Adderall 20 mg 2-3 times daily for management of excessive sleepiness associated with narcolepsy diagnosis  Try to ensure adequate sleep duration; there is no surrogate for getting sufficient sleep in order to feel adequately rested  Follow-up in sleep clinic in 4 to 6 months  Good luck with ongoing work-up for your involuntary movements         Date of Visit: 12/9/2022     CC: involuntary movements     HPI:  Ms. Pearce is a 48 yo woman who presents for evaluation of involuntary movements.  Her movements began over 18 months year ago with a gradual onset.  At first, she didn't  notice them but her  pointed them out.  Started around her mouth.  She can't suppress them.  Involves her whole body.  At night she has spasms while trying to fall asleep.  Worsened by being let go from her job.  Sometimes she feels an urge to move.  She is able to suppress it temporarily.  She thinks it's worse in the evenings.  She is stumbling more and dropping things.     She is also reporting cognitive changes.  For example, couldn't remember her passwords.  Loses her train of thought easily.  Also calling objects by the wrong name.  Has trouble with concepts of time.  May have interfered with her job.     Endorses dysphagia.     She had COVID in 2020.  She was on Abilify and Risperdal previously.  She has also taken antiemetics previously.  Zofran sounded familiar to her.     She took CD/LD but didn't notice any change. She has been on Cogentin for awhile but it is causing dry mouth.     They describe an episode where she was found in the kitchen with her hands full and appeared to be seeing things.  The following night she put a bowl and spoon in the kitchen in the middle of the night.  This happened last year.  No other clear episodes but he notices objects put away in odd places.     She is working with a psychiatrist every 3 months and a therapist weekly.        Previously on aripiprazole.  Started on CD/LD to rule out dopa-responsive dystonia.  CAG repeat: 18 and 20 (Normal)     Denies family hx of chorea.  Her mother had dementia and  in her early 70s.  She was in a nursing home for about 10 years.     LABS:  HD gene testing - RESULT: CAG repeat: 18 and 20 (Normal)  TSH 0.35 on 2022  Free T4 0.9 on 2022     IMAGING:  MRI brain w/o contrast 2022 - personally reviewed: unremarkable      ASSESSMENT/PLAN:  Ms. Pearce is a 48 yo woman who presents for evaluation of involuntary movements.  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  The etiology of this is unknown but HD has been rule out.      - Lab tests: CBC with smear, RFP, LFTs, INOCENTE, copper, ceruloplasmin, ferritin, autoimmune movement disorder panel, paraneoplastic antibodies, PTH, TSH, vitamin B12  - Consider genetic testing  - Start olanzapine for chorea  - RTC 3 months 30 minutes        Halley Lozoya MD        ______________________________________      Patient was asked about 14 Review of systems including changes in vision (dry eyes, double vision), hearing, heart, lungs, musculoskeletal, depression, anxiety, snoring, RBD, insomnia, urinary frequency, urinary urgency, constipation, swallowing problems, hematological, ID, allergies, skin problems: seborrhea, endocrinological: thyroid, diabetes, cholesterol; balance, weight changes, and other neurological problems and these were not significant at this time except for   Patient Active Problem List   Diagnosis    Bipolar disorder, in full remission, most recent episode depressed    Migraine without status migrainosus, not intractable, unspecified migraine type    Narcolepsy without cataplexy(347.00)    Obesity, Class I, BMI 30.0-34.9 (see actual BMI)    OCD (obsessive compulsive disorder)    PTSD (post-traumatic stress disorder)    Anxiety    Major depression          Allergies   Allergen Reactions    Penicillins Hives     Past Surgical History:   Procedure Laterality Date    FOOT SURGERY Right     TONSILLECTOMY & ADENOIDECTOMY      TUBAL LIGATION       Past Medical History:   Diagnosis Date    Bipolar disorder (H)      Social History     Socioeconomic History    Marital status:      Spouse name: Not on file    Number of children: Not on file    Years of education: Not on file    Highest education level: Not on file   Occupational History    Not on file   Tobacco Use    Smoking status: Former     Current packs/day: 1.00     Average packs/day: 1 pack/day for 7.0 years (7.0 ttl pk-yrs)     Types: Cigarettes    Smokeless tobacco: Never   Substance and Sexual Activity    Alcohol use: Yes      Alcohol/week: 2.0 standard drinks of alcohol     Types: 2 Shots of liquor per week     Comment: 2 drinks weekly    Drug use: Yes     Frequency: 5.0 times per week     Types: Marijuana    Sexual activity: Not on file   Other Topics Concern    Not on file   Social History Narrative        Denies family hx of chorea.  Her mother had dementia and  in her early 70s.  She was in a nursing home for about 10 years.        HD gene testing - RESULT: CAG repeat: 18 and 20 (Normal)    TSH 0.35 on 2022    Free T4 0.9 on 2022     Social Drivers of Health     Financial Resource Strain: Not on file   Food Insecurity: Not on file   Transportation Needs: Not on file   Physical Activity: Not on file   Stress: Not on file   Social Connections: Not on file   Interpersonal Safety: Not on file   Housing Stability: Not on file       Drug and lactation database from the [place] [employer] of Medicine:  http://toxnet.nlm.nih.gov/cgi-bin/sis/htmlgen?LACT      B/P: Data Unavailable, T: Data Unavailable, P: Data Unavailable, R: Data Unavailable 0 lbs 0 oz  There were no vitals taken for this visit., There is no height or weight on file to calculate BMI.  Medications and Vitals not listed above were documented in the cart and reviewed by me.     Current Outpatient Medications   Medication Sig Dispense Refill    cephALEXin (KEFLEX) 500 MG capsule       ADVAIR DISKUS 100-50 MCG/ACT inhaler Inhale 1 puff into the lungs 2 times daily      amphetamine-dextroamphetamine (ADDERALL) 20 MG tablet Take 20 mg by mouth 3 times daily      busPIRone HCl (BUSPAR) 30 MG tablet Take 1 tablet by mouth 2 times daily      cholecalciferol 25 MCG (1000 UT) TABS Take 1,000 Units by mouth daily      ferrous sulfate (FEROSUL) 325 (65 Fe) MG tablet Take 325 mg by mouth daily (with breakfast)      Hypertonic Nasal Wash (SINUS RINSE) bottle Instill 1 Each nasally 2 (two) times a day.      QUEtiapine (SEROQUEL) 50 MG tablet Take 1 tablet (50 mg)  by mouth 2 times daily 60 tablet 11    valbenazine (INGREZZA) 40 MG capsule Take 1 capsule (40 mg) by mouth daily. 30 capsule 11    VENTOLIN  (90 Base) MCG/ACT inhaler Inhale 1 puff into the lungs every 4 hours as needed      vitamin C (ASCORBIC ACID) 500 MG tablet Take 100 mg by mouth daily           Mitchel Giron MD

## 2025-02-19 NOTE — TELEPHONE ENCOUNTER
REASON FOR VISIT: Chorea   DATE OF APPT: 3/17/2025   NOTES (FOR ALL VISITS) STATUS DETAILS   OFFICE NOTE from referring provider N/A    OFFICE NOTE from other specialist [facility] Halley Turk MD 1/23/2024   MEDICATION LIST N/A    IMAGING  (FOR ALL VISITS)     XR N/A    MRI (HEAD, NECK, SPINE) N/A    CT (HEAD, NECK, SPINE) N/A

## 2025-02-20 ENCOUNTER — VIRTUAL VISIT (OUTPATIENT)
Dept: PHARMACY | Facility: CLINIC | Age: 50
End: 2025-02-20
Attending: PSYCHIATRY & NEUROLOGY
Payer: COMMERCIAL

## 2025-02-20 DIAGNOSIS — F41.1 GAD (GENERALIZED ANXIETY DISORDER): Primary | ICD-10-CM

## 2025-02-20 DIAGNOSIS — G47.419 NARCOLEPSY WITHOUT CATAPLEXY(347.00): ICD-10-CM

## 2025-02-20 DIAGNOSIS — G25.5 CHOREA: ICD-10-CM

## 2025-02-20 DIAGNOSIS — Z78.9 TAKES DIETARY SUPPLEMENTS: ICD-10-CM

## 2025-02-20 NOTE — Clinical Note
She's been on Ingrezza for 9 months and noticed significant improvement, though has had weight gain. Reduced quetiapine from 50mg BID to just at bedtime with no change, so stopped it today and she sees you in a month.

## 2025-02-20 NOTE — PATIENT INSTRUCTIONS
"Recommendations from today's MTM visit:                                                    MTM (medication therapy management) is a service provided by a clinical pharmacist designed to help you get the most of out of your medicines.   Today we reviewed what your medicines are for, how to know if they are working, that your medicines are safe and how to make your medicine regimen as easy as possible.        -stop taking quetiapine. Monitor for increased movements or anxiety/irritability  -I sent a refill for Ingrezza      Follow-up: 6 months or sooner if needed    It was great speaking with you today.  I value your experience and would be very thankful for your time in providing feedback in our clinic survey. In the next few days, you may receive an email or text message from [email or web address] with a link to a survey related to your  clinical pharmacist.\"     To schedule another MTM appointment, please call the clinic directly or you may call the MTM scheduling line at [phone].    My Clinical Pharmacist's contact information:                                                      Please feel free to contact me with any questions or concerns you have.      Jennifer Barksdale, PharmD  Medication Therapy Management Pharmacist  [facility]    "

## 2025-02-20 NOTE — PROGRESS NOTES
Medication Therapy Management (MTM) Encounter    ASSESSMENT:                            Medication Adherence/Access: No issues identified.    Chorea:   Discussed option of switching Ingrezza to [place] to see if less effect on weight, though carries risk of losing efficacy that has been quite significant. She elects to continue with Ingrezza for now and will continue with lifestyle modifications to help reduce weight. Also discussed that quetiapine seems like it might not being doing much for her and could possibly contribute to some impact on weight. She is open to discontinuing. Will monitor movements, in addition to agitation/anxiety.    anxiety    Stable. Continue current medication.    Narcolepsy    Stable. Continue current medication.    Supplements  Stable. Continue current medication.    PLAN:                            -stop taking quetiapine. Monitor for increased movements or anxiety/irritability    Follow-up: 6 months or sooner if needed    SUBJECTIVE/OBJECTIVE:                          Purvi Pearce is a 49 year old female seen for an initial visit. She was referred to me from Dr. Giron, Neurology.      Reason for visit: med review.    Allergies/ADRs: Reviewed in chart  Past Medical History: Reviewed in chart  Tobacco: She reports that she has quit smoking. Her smoking use included cigarettes. She has a 7 pack-year smoking history. She has never used smokeless tobacco.  Alcohol: not currently using    Medication Adherence/Access: no issues reported.    Chorea:   -Ingrezza 40mg daily  -quetiapine 50mg at bedtime    Pt started quetiapine 5/2023 and Ingrezza April or May 2024. States that she gained about 30lb in the first 6 months, but has more recently been able to maintain for the last couple months. She has been really been trying to be more mindful of what she is eating. Is active, walking around all day at work, but no specific exercise regime.   PCP reduced quetiapine in case related to weight gain  "(from 50mg BID to 50mg at bedtime), but didn't notice any improvement, though also no worsening chorea.  Both patient and  notice significant reduction in general movements with Ingrezza.  doesn't notice them at all. Pt states that she doesn't have to concentrate on controlling movements anymore. She will notice some mild mouth movements if she is really focused on something.    Wt Readings from Last 4 Encounters:   01/23/24 183 lb (83 kg)   10/24/23 191 lb (86.6 kg)     Current weight (per pt report): 211lb    anxiety    -buspirone 30mg BID  Feels stable, especially with improvement in chorea over the last 9 months. Reported previous bipolar diagnosis and \"still have my ups and downs,\"  but feeling quite manageable, \"best it's been for a long time.\" No concerns.     Narcolepsy    -Adderall 20mg TID  Does not take the third dose on most days, depending on the length of work day or what else she needs to do. Denied any concerns. Falls asleep without issue and stays asleep through the night, but does not generally feels rested upon waking.       Supplements  -Vitamin D 1000 units daily  -ferrous sulfate 325mg every other day  -Vitamin C 100mg every other day  -magnesium 400mg nightly  No reported issues at this time.     ----------------    I spent 20 minutes with this patient today. All changes were made via collaborative practice agreement with Mitchel Giron.     A summary of these recommendations was sent via clinic portal.    Jennifer Barksdale, PharmD  Medication Therapy Management Pharmacist  [facility] Neurology Clin[facility]      Telemedicine Visit Details  The patient's medications can be safely assessed via a telemedicine encounter.  Type of service:  Telephone visit  Originating Location (pt. Location): Home    Distant Location (provider location):  Off-site  Start Time:  8:00am  End Time:  8:20am     Medication Therapy Recommendations  Chorea   1 Current Medication: QUEtiapine " (SEROQUEL) 50 MG tablet (Discontinued)   Current Medication Sig: Take 1 tablet (50 mg) by mouth 2 times daily   Rationale: No medical indication at this time - Unnecessary medication therapy - Indication   Recommendation: Discontinue Medication - stop taking   Status: Accepted per CPA   Identified Date: 2/20/2025 Completed Date: 2/20/2025

## 2025-03-11 ENCOUNTER — TELEPHONE (OUTPATIENT)
Dept: NEUROLOGY | Facility: CLINIC | Age: 50
End: 2025-03-11
Payer: COMMERCIAL

## 2025-03-11 DIAGNOSIS — G25.5 CHOREA: Primary | ICD-10-CM

## 2025-03-11 RX ORDER — QUETIAPINE FUMARATE 50 MG/1
50 TABLET, FILM COATED ORAL 2 TIMES DAILY
Qty: 60 TABLET | Refills: 11 | Status: SHIPPED | OUTPATIENT
Start: 2025-03-11

## 2025-03-11 NOTE — TELEPHONE ENCOUNTER
Medication: Seroquel  Sig: Sig - Route: Take 1 tablet (50 mg) by mouth 2 times daily - Oral   Date last written: 2/13/24  Dispensed amount: 60 tabs  Refills: 11 refills    Requested Pharmacy: [facility]    Pt's last office visit: 1/23/24 (Dr. Lozoya)  Next scheduled office visit: 3/17/25      Per the RN/LPN medication refill protocol, writer is unable to refill this request.

## 2025-03-16 PROBLEM — G25.5 CHOREA: Status: ACTIVE | Noted: 2023-07-11

## 2025-03-16 RX ORDER — CALCIUM CARBONATE 300MG(750)
400 TABLET,CHEWABLE ORAL DAILY
COMMUNITY
Start: 2023-07-08

## 2025-03-17 ENCOUNTER — PRE VISIT (OUTPATIENT)
Dept: NEUROLOGY | Facility: CLINIC | Age: 50
End: 2025-03-17

## 2025-03-17 ENCOUNTER — OFFICE VISIT (OUTPATIENT)
Dept: NEUROLOGY | Facility: CLINIC | Age: 50
End: 2025-03-17
Payer: COMMERCIAL

## 2025-03-17 VITALS
HEART RATE: 93 BPM | HEIGHT: 66 IN | BODY MASS INDEX: 32.95 KG/M2 | SYSTOLIC BLOOD PRESSURE: 128 MMHG | WEIGHT: 205 LBS | DIASTOLIC BLOOD PRESSURE: 83 MMHG

## 2025-03-17 DIAGNOSIS — G25.5 CHOREA: Primary | ICD-10-CM

## 2025-03-17 DIAGNOSIS — R76.0 ELEVATED ANTIBODY LEVELS: ICD-10-CM

## 2025-03-17 LAB
Lab: NORMAL
PERFORMING LABORATORY: NORMAL
SPECIMEN STATUS: NORMAL
TEST NAME: NORMAL

## 2025-03-17 PROCEDURE — G2211 COMPLEX E/M VISIT ADD ON: HCPCS | Performed by: PSYCHIATRY & NEUROLOGY

## 2025-03-17 PROCEDURE — 99417 PROLNG OP E/M EACH 15 MIN: CPT | Performed by: PSYCHIATRY & NEUROLOGY

## 2025-03-17 PROCEDURE — 99215 OFFICE O/P EST HI 40 MIN: CPT | Performed by: PSYCHIATRY & NEUROLOGY

## 2025-03-17 RX ORDER — MAGNESIUM OXIDE 420 MG/1
400 TABLET ORAL DAILY
COMMUNITY
End: 2025-03-17

## 2025-03-17 RX ORDER — CEPHALEXIN 250 MG/1
1 CAPSULE ORAL PRN
COMMUNITY
Start: 2025-03-17

## 2025-03-17 RX ORDER — BACLOFEN 20 MG
TABLET ORAL
COMMUNITY

## 2025-03-17 NOTE — NURSING NOTE
"Purvi Pearce's goals for this visit include:   Chief Complaint   Patient presents with    Consult For    New Patient     Chorea       She requests these members of her care team be copied on today's visit information: yes    PCP: Desire Rebollar    Referring Provider:  Mitchel Giron MD  [address] [place] [id number]    /83 (BP Location: Right arm, Patient Position: Sitting, Cuff Size: Adult Regular)   Pulse 93   Ht 1.676 m (5' 6\")   Wt 93 kg (205 lb)   BMI 33.09 kg/m      Do you need any medication refills at today's visit? No  ORLANDO Burns, VASQUEZ ([facility])      "

## 2025-03-17 NOTE — PATIENT INSTRUCTIONS
Assessment:  (G25.5) Chorea  (primary encounter diagnosis)  2020 covid  Involuntary movements noticed by her .   HD ruled out  Additional genetic testing did not show an abnormality.     Review of diagnosis    chorea    Avoidance of dopamine blockers   No clear exposure    Motor complication review   N/a    Review of Impulse control disorders   N/a    Review of surgical or medication options   Reviewed.     Gait/Balance/Falls   No falls  No balance issues.     Exercise/Therapy performed/offered   Works 25-30 hours per week    Cognitive/Driving   Had cognitive testing in [place] with [facility] and went well and better from the prior testing  Had memory problems in the past and and are better.   No problems driving.   Taking narcolepsy medication.     Mood   Depression  Bipolar  Ocd  Ptsd  Anxiety    Been on medication since age 17 yrs  Prozac and ambien  Has been on effexor  Has been on paxil  Has not been on celexa  Has been on wellbutrin  Not clear but may have been on lexapro  Has not been on lithium  Has been on depakote    Mood is stable than it has been for a while.   Getting back to work after movement anxiety have been controlled  Was off for 2 years.     Has a therapist - mj cowart through [employer] [facility] [employer].   Virtual visits now sporadic and met once in past 6 months  Buspar 30mg 2/day  Quetiapine 50mg at night.     Rustad for adderall for narcolepsy     [facility]   Working 25-30 hours   Spouse is working at the [employer] - cook    19 years   1st marriage  Together 27 years  2 sons from this relationship.     Distant exposure of meth  No clear antipsychotic exposure.     Hallucinations/delusions   no    Sleep   Brother luna has narcolepsy  Narcolepsy diagnosed in 2020 with sleep study  Been on adderall 20mg 3/day prescription - takes it 2/day often     Has leg cramping and they may want to move  Taking magnesium   Has sleep issues and is taking seroquel that helps  her fall asleep     Bladder/Renal/Prostate/Gyn/Other   Renal stones - one many years ago and one feb 2025  Had contacted doctor as had suspected a UTI and had problems getting in and came in to the ED  Had abdominal pain and to the right low back.   No bladder or unit(s) rinary problems.     GI/Constipation/GERD   Rare GERD - may take tums  No constipation.   No problem with the iron and constipation.   Obesity  Weight gain - wondering if related to ingrezza vs seroquel  But seroquel was helping with sleep    CT ABDOMEN 2/1/2025    FINDINGS:   Liver: Mild hepatomegaly.   Gallbladder and biliary ducts: Normal. No calcified stones. No ductal dilation.   Pancreas: Normal. No ductal dilation.   Spleen: Normal. No splenomegaly.   Adrenal glands: Normal. No mass.   Kidneys and ureters: Mild right hydroureteronephrosis with stranding of the   right periureteral fat. No obstructive nephrolithiasis is appreciated. Suspect   a recently passed stone.   Stomach and bowel: Unremarkable. No obstruction. No mucosal thickening.   Appendix: No evidence of appendicitis.     Intraperitoneal space: Unremarkable. No free air. No significant fluid   collection.   Vasculature: Unremarkable. No abdominal aortic aneurysm.   Lymph nodes: Unremarkable. No enlarged lymph nodes.   Urinary bladder: Mild thickening of the urinary bladder wall with trace   perivesical fat stranding. Correlate with urinalysis.   Reproductive: 2 cm simple appearing left ovarian cyst.   Bones/joints: Mild multilevel degenerative disc disease and facet arthropathy   of the thoracolumbar spine.   Soft tissues: Unremarkable.     IMPRESSION:   Mild right hydroureteronephrosis with stranding of the right periureteral fat.   No obstructive nephrolithiasis is appreciated. Suspect a recently passed stone.     Mild thickening of the urinary bladder wall with trace perivesical fat   stranding. Correlate with urinalysis.     ENDO/Lipid/DM/Bone density/Thyroid  Vit D  "deficiency  Taking vitamin D    No thyroid or cholesterol or diabetes issues  Has not had a bone density    No results found for: \"A1C\"  No results for input(s): \"CHOL\", \"HDL\", \"LDL\", \"TRIG\", \"CHOLHDLRATIO\" in the last 34873 hours.    Cardio/heart/Hyper or Hypotensive   /83 (BP Location: Right arm, Patient Position: Sitting, Cuff Size: Adult Regular)   Pulse 93   Ht 1.676 m (5' 6\")   Wt 93 kg (205 lb)   BMI 33.09 kg/m    No heart issues  Has not passed out.   He episodes where was getting tunnel vision and then went back to bed    Vision/Dry Eyes/Cataracts/Glaucoma/Macular   Wears contacts or glasses.     Heme/Anticoagulation/Antiplatelet/Anemia/Other  Ferrous sulfate every other day   Was weak and fatigued  No bleeding or blood disorders  No miscarriages    Ferritin   Date Value Ref Range Status   12/09/2022 23 6 - 175 ng/mL Final     ENT/Resp  FORMER SMOKER 2016  SMOKED 30 YEARS.   NO LOSS OF SMELL OR TASTE  NO SINUS PROBLEMS.   HAS HAD PNEUMONIA A FEW TIMES.     CHEST XRAY 9/21/2024  Since prior exam, new patchy airspace opacities in the right middle lobe and right hilar fullness, likely infectious in etiology. Subsegmental atelectasis in the right midlung. Remainder is not significantly changed. No pneumothorax or pleural effusion. Normal cardiac silhouette.     Has not had a followup xray.     CT chest abd pelvis 1/31/2023  Chest: There are no pulmonary nodules. There are no infiltrates or effusions. There is no mediastinal or hilar adenopathy. The heart and vascular structures are within normal limits. There is no axillary or supraclavicular adenopathy.     Abdomen pelvis: The liver, gallbladder, pancreas, spleen are unremarkable. The adrenal glands are symmetric. The kidneys demonstrate symmetric excretion of contrast. There is no hydronephrosis. There is no large or small bowel distention. There is no abdominal or pelvic adenopathy.     There are no skeletal lesions.     IMPRESSION:   1. Negative " exam the chest, abdomen, and pelvis.     Skin/Cancer/Seborrhea/other  No cancer  Rosacea     Musculoskeletal/Pain/Headache  Migraine  - none for a few months.   Tunnel vision  With her migraine she has light sensitivity, they are pounding  She may take ibuprofen for them - may help  Has gone to the ED and had toradol.     Other:        Medications             Advair diskus inhaler 100 50  prn       Amphetamine dextroamphetamin adderall 20  1  varies 1    Buspirone buspar 30mg  1     1   Cholecalciferol 25mg 1000 Vit D  1        Ferrous sulfate ferosul 325 65 Fe Q0d        Hypertonic nasal wash sinus rinse  off        Magnesium 400mg or 500mg   1   Quetiapine seroquel 50mg      1   Valbenazine ingrezza 40mg  1        Ventolin hFA 108 inhaler  prn        Vit C ascorbic acid 500mg   q0d                                                  Plan:    Had P/Q antibody abnormality that may have been a spurious finding and may check again to see if abnormality.   She had CT scanning done in the past that was unrevealing for cancer in her chest/abdomen, etc.   She is getting regular mammograms and a normal colonoscopy. She had a recent renal stone and had a ct abdomen done as well had a pneumonia in the fall 2024 and may consider getting a followup imaging from her PCP for this to show the abnormality has cleared. She  is asymptomatic from a lung point of view    Chorea noted and documented.   She has a negra that will cover for the next year for the ingrezza  She has worked with Jennifer Barksdale once.     Return back in 9-12 months.         [email or web address]    0=none; 1=minimal; 2=mild, 3=moderate; 4=severe    AIMS  Facial and Oral movements.   1. Muscle of facial expression (forehead/eyebrows/periorbital/cheeks/blinking/upper face)   3    2. Lips and perioral area (puckering/pouting/smacking)                    2    3. Jaw  (biting/clenching/chewing/mouth opening/lateral movement)                                       3    4. Tongue (In and out  Mouth movement)                                                                                 0    Extremity movements  5. Upper (arm/wrists/fingers/chorea/athetoid but not tremor)                                                  0    6. Lower (lateral knee/foot tapping/heel dropping/foot squirming/inversion/eversion)              0    Trunk Neck/shoulder/hips (rocking/twisting/squirming/pelvic/diaphragm)                               0    Global judgments  8. Severity of movements                                                                                                         2  0=none; 1=minimal; 2=mild, 3=moderate; 4=severe                 9. Incapacitation due to abnormal movements                                                                        2  0=none/1=minimal/2=mild/3=mild;4=severe      10. Patient awareness of abnormal movements                                                                     2  0=none/1=aware-no distress/2=aware-mild distress/3=aware-mod distress/4=aware-severe distress    Dental Status  11. Current problems with teeth and/or dentures 0=no; 1 = yes                                             0                                                                                                        12. Does patient usually wear dentures  0=no; 1=yes                                                            0                                                                                                                                  AIMS Score                                                                                                                           14

## 2025-03-17 NOTE — LETTER
3/17/2025      Purvi Pearce  [address] [facility] [address]      Dear Colleague,    Thank you for referring your patient, Purvi Pearce, to the [facility] [place]. Please see a copy of my visit note below.    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   HD ruled out  Additional genetic testing did not show an abnormality.     Review of diagnosis    chorea    Avoidance of dopamine blockers   No clear exposure    Motor complication review   N/a    Review of Impulse control disorders   N/a    Review of surgical or medication options   Reviewed.     Gait/Balance/Falls   No falls  No balance issues.     Exercise/Therapy performed/offered   Works 25-30 hours per week    Cognitive/Driving   Had cognitive testing in [place] with [facility] and went well and better from the prior testing  Had memory problems in the past and and are better.   No problems driving.   Taking narcolepsy medication.     Mood   Depression  Bipolar  Ocd  Ptsd  Anxiety    Been on medication since age 17 yrs  Prozac and ambien  Has been on effexor  Has been on paxil  Has not been on celexa  Has been on wellbutrin  Not clear but may have been on lexapro  Has not been on lithium  Has been on depakote    Mood is stable than it has been for a while.   Getting back to work after movement anxiety have been controlled  Was off for 2 years.     Has a therapist - mj cowart through [employer].   Virtual visits now sporadic and met once in past 6 months  Buspar 30mg 2/day  Quetiapine 50mg at night.     Rustad for adderall for narcolepsy     [facility]   Working 25-30 hours   Spouse is  working at the [employer]    19 years   1st marriage  Together 27 years  2 sons from this relationship.     Distant exposure of meth  No clear antipsychotic exposure.     Hallucinations/delusions   no    Sleep   Brother luna has narcolepsy  Narcolepsy diagnosed in 2020 with sleep study  Been on adderall 20mg 3/day prescription - takes it 2/day often     Has leg cramping and they may want to move  Taking magnesium   Has sleep issues and is taking seroquel that helps her fall asleep     Bladder/Renal/Prostate/Gyn/Other   Renal stones - one many years ago and one feb 2025  Had contacted doctor as had suspected a UTI and had problems getting in and came in to the ED  Had abdominal pain and to the right low back.   No bladder or unit(s) rinary problems.     GI/Constipation/GERD   Rare GERD - may take tums  No constipation.   No problem with the iron and constipation.   Obesity  Weight gain - wondering if related to ingrezza vs seroquel  But seroquel was helping with sleep    CT ABDOMEN 2/1/2025    FINDINGS:   Liver: Mild hepatomegaly.   Gallbladder and biliary ducts: Normal. No calcified stones. No ductal dilation.   Pancreas: Normal. No ductal dilation.   Spleen: Normal. No splenomegaly.   Adrenal glands: Normal. No mass.   Kidneys and ureters: Mild right hydroureteronephrosis with stranding of the   right periureteral fat. No obstructive nephrolithiasis is appreciated. Suspect   a recently passed stone.   Stomach and bowel: Unremarkable. No obstruction. No mucosal thickening.   Appendix: No evidence of appendicitis.     Intraperitoneal space: Unremarkable. No free air. No significant fluid   collection.   Vasculature: Unremarkable. No abdominal aortic aneurysm.   Lymph nodes: Unremarkable. No enlarged lymph nodes.   Urinary bladder: Mild thickening of the urinary bladder wall with trace   perivesical fat stranding. Correlate with urinalysis.   Reproductive: 2 cm simple appearing left ovarian cyst.  "  Bones/joints: Mild multilevel degenerative disc disease and facet arthropathy   of the thoracolumbar spine.   Soft tissues: Unremarkable.     IMPRESSION:   Mild right hydroureteronephrosis with stranding of the right periureteral fat.   No obstructive nephrolithiasis is appreciated. Suspect a recently passed stone.     Mild thickening of the urinary bladder wall with trace perivesical fat   stranding. Correlate with urinalysis.     ENDO/Lipid/DM/Bone density/Thyroid  Vit D deficiency  Taking vitamin D    No thyroid or cholesterol or diabetes issues  Has not had a bone density    No results found for: \"A1C\"  No results for input(s): \"CHOL\", \"HDL\", \"LDL\", \"TRIG\", \"CHOLHDLRATIO\" in the last 08116 hours.    Cardio/heart/Hyper or Hypotensive   /83 (BP Location: Right arm, Patient Position: Sitting, Cuff Size: Adult Regular)   Pulse 93   Ht 1.676 m (5' 6\")   Wt 93 kg (205 lb)   BMI 33.09 kg/m    No heart issues  Has not passed out.   He episodes where was getting tunnel vision and then went back to bed    Vision/Dry Eyes/Cataracts/Glaucoma/Macular   Wears contacts or glasses.     Heme/Anticoagulation/Antiplatelet/Anemia/Other  Ferrous sulfate every other day   Was weak and fatigued  No bleeding or blood disorders  No miscarriages    Ferritin   Date Value Ref Range Status   12/09/2022 23 6 - 175 ng/mL Final     ENT/Resp  FORMER SMOKER 2016  SMOKED 30 YEARS.   NO LOSS OF SMELL OR TASTE  NO SINUS PROBLEMS.   HAS HAD PNEUMONIA A FEW TIMES.     CHEST XRAY 9/21/2024  Since prior exam, new patchy airspace opacities in the right middle lobe and right hilar fullness, likely infectious in etiology. Subsegmental atelectasis in the right midlung. Remainder is not significantly changed. No pneumothorax or pleural effusion. Normal cardiac silhouette.     Has not had a followup xray.     CT chest abd pelvis 1/31/2023  Chest: There are no pulmonary nodules. There are no infiltrates or effusions. There is no mediastinal or " hilar adenopathy. The heart and vascular structures are within normal limits. There is no axillary or supraclavicular adenopathy.     Abdomen pelvis: The liver, gallbladder, pancreas, spleen are unremarkable. The adrenal glands are symmetric. The kidneys demonstrate symmetric excretion of contrast. There is no hydronephrosis. There is no large or small bowel distention. There is no abdominal or pelvic adenopathy.     There are no skeletal lesions.     IMPRESSION:   1. Negative exam the chest, abdomen, and pelvis.     Skin/Cancer/Seborrhea/other  No cancer  Rosacea     Musculoskeletal/Pain/Headache  Migraine  - none for a few months.   Tunnel vision  With her migraine she has light sensitivity, they are pounding  She may take ibuprofen for them - may help  Has gone to the ED and had toradol.     Other:        Medications             Advair diskus inhaler 100 50  prn       Amphetamine dextroamphetamin adderall 20  1  varies 1    Buspirone buspar 30mg  1     1   Cholecalciferol 25mg 1000 Vit D  1        Ferrous sulfate ferosul 325 65 Fe Q0d        Hypertonic nasal wash sinus rinse  off        Magnesium 400mg or 500mg   1   Quetiapine seroquel 50mg      1   Valbenazine ingrezza 40mg  1        Ventolin hFA 108 inhaler  prn        Vit C ascorbic acid 500mg   q0d                                                  Plan:    Had P/Q antibody abnormality that may have been a spurious finding and may check again to see if abnormality.   She had CT scanning done in the past that was unrevealing for cancer in her chest/abdomen, etc.   She is getting regular mammograms and a normal colonoscopy. She had a recent renal stone and had a ct abdomen done as well had a pneumonia in the fall 2024 and may consider getting a followup imaging from her PCP for this to show the abnormality has cleared. She  is asymptomatic from a lung point of view    Chorea noted and documented.   She has a negra that will cover for the next year for the  ashwini  She has worked with Jennifer Barksdale once.     Return back in 9-12 months.         [email or web address]    0=none; 1=minimal; 2=mild, 3=moderate; 4=severe    AIMS  Facial and Oral movements.   1. Muscle of facial expression (forehead/eyebrows/periorbital/cheeks/blinking/upper face)   3    2. Lips and perioral area (puckering/pouting/smacking)                    2    3. Jaw (biting/clenching/chewing/mouth opening/lateral movement)                                       3    4. Tongue (In and out  Mouth movement)                                                                                 0    Extremity movements  5. Upper (arm/wrists/fingers/chorea/athetoid but not tremor)                                                  0    6. Lower (lateral knee/foot tapping/heel dropping/foot squirming/inversion/eversion)              0    Trunk Neck/shoulder/hips (rocking/twisting/squirming/pelvic/diaphragm)                               0    Global judgments  8. Severity of movements                                                                                                         2  0=none; 1=minimal; 2=mild, 3=moderate; 4=severe                 9. Incapacitation due to abnormal movements                                                                        2  0=none/1=minimal/2=mild/3=mild;4=severe      10. Patient awareness of abnormal movements                                                                     2  0=none/1=aware-no distress/2=aware-mild distress/3=aware-mod distress/4=aware-severe distress    Dental Status  11. Current problems with teeth and/or dentures 0=no; 1 = yes                                             0                                                                                                        12. Does patient usually wear dentures  0=no; 1=yes                                                             0                                                                                                                                  AIMS Score                                                                                                                           14                Coding statement:   Medical Decision Making:  #  Chronic progressive medical conditions addressed  - see above --   Review and/or interpretation of unique test or documentation from a provider outside of neurology yes   Independent historian provided additional details  no I  Prescription drug management and review of potential side effects and/or monitoring for side effects  -- see above ---  Health impacted by social determinants of health  no    I have reviewed the note as documented above.  This accurately captures the substance of my conversation with the patient and total time spent preparing for visit, executing visit and completing visit on the day of the visit:  60 minutes.  The portion of this total time included face to face time     The longitudinal plan of care for Purvi Pearce was addressed during this visit. Due to the added complexity in care, I will continue to support Purvi Pearce in the subsequent management of this condition(s) and with the ongoing continuity of care of this condition(s).      Mitchel Giron MD     ______________________________________    Last visit date and details:     PARANEOPLASTIC PANEL    Result Notes         Component  Ref Range & Units 2 yr ago    Interpretive Comments SEE NOTE   Comment: The following antibody was identified: Calcium Channel  Binding Antibody, P/Q-type. * This profile, in the proper  clinical context, would support neurological autoimmunity.  * The positive predictive value for an autoimmune  neurological diagnosis (Lambert-Eaton syndrome or diverse  encephalomyeloneuropathic phenotypes) among patients with  positive values for P/Q -type calcium channel  antibodies  0.10-0.99 nM is 24%. * The positive predictive value for a  cancer diagnosis (diverse types) among patients positive  for P/Q-type calcium channel antibody is 21%; approximately  18% are historical neoplasms, and 3% are detected  prospectively. * References: Chava NL, [facility],  Clayton ALCALA, Luis SOUZA, Laura SJ, Kayleigh A. P/Q- and  N-type calcium-channel antibodies: Oncological,  neurological, and serological accompaniments. Muscle Nerve.  2016;54:220-7. *    Amphiphysin Ab, S  <1:240 titer Negative   Comment:    -------------------ADDITIONAL INFORMATION-------------------  This test was developed and its performance characteristics  determined by [facility] in a manner consistent with CLIA  requirements. This test has not been cleared or approved by  the U.S. Food and Drug Administration.    AGNA-1, S  <1:240 titer Negative   Comment:    -------------------ADDITIONAL INFORMATION-------------------  This test was developed and its performance characteristics  determined by [facility] in a manner consistent with CLIA  requirements. This test has not been cleared or approved by  the U.S. Food and Drug Administration.    OCTAVIO-1, S  <1:240 titer Negative    OCTAVIO-2, S  <1:240 titer Negative   Comment:    -------------------ADDITIONAL INFORMATION-------------------  This test was developed and its performance characteristics  determined by [facility] in a manner consistent with CLIA  requirements. This test has not been cleared or approved by  the U.S. Food and Drug Administration.    OCTAVIO-3, S  <1:240 titer Negative   Comment:    -------------------ADDITIONAL INFORMATION-------------------  This test was developed and its performance characteristics  determined by [facility] in a manner consistent with CLIA  requirements. This test has not been cleared or approved by  the U.S. Food and Drug Administration.    CRMP-5-IgG, S  <1:240 titer Negative   Comment:    -------------------ADDITIONAL  INFORMATION-------------------  This test was developed and its performance characteristics  determined by [facility] in a manner consistent with CLIA  requirements. This test has not been cleared or approved by  the U.S. Food and Drug Administration.    Neuronal (V-G) K+ Channel Ab, S  <=0.02 nmol/L 0.02   Comment:    -------------------ADDITIONAL INFORMATION-------------------  This test was developed and its performance characteristics  determined by [facility] in a manner consistent with CLIA  requirements. This test has not been cleared or approved by  the U.S. Food and Drug Administration.    P/Q-Type Calcium Channel Ab  <=0.02 nmol/L 0.10 High    Comment:    -------------------ADDITIONAL INFORMATION-------------------  This test was developed and its performance characteristics  determined by [facility] in a manner consistent with CLIA  requirements. This test has not been cleared or approved by  the U.S. Food and Drug Administration.    PCA-1, S  <1:240 titer Negative   Comment:    -------------------ADDITIONAL INFORMATION-------------------  This test was developed and its performance characteristics  determined by [facility] in a manner consistent with CLIA  requirements. This test has not been cleared or approved by  the U.S. Food and Drug Administration.    PCA-2, S  <1:240 titer Negative   Comment:    -------------------ADDITIONAL INFORMATION-------------------  This test was developed and its performance characteristics  determined by [facility] in a manner consistent with CLIA  requirements. This test has not been cleared or approved by  the U.S. Food and Drug Administration.    PCA-Tr, S  <1:240 titer Negative   Comment:    -------------------ADDITIONAL INFORMATION-------------------  This test was developed and its performance characteristics  determined by [facility] in a manner consistent with CLIA  requirements. This test has not been cleared or approved by  the U.S. Food and Drug  "Administration.     Test Performed by:  [address] [address]  : Cristiano Hunter M.D. Ph.D.; CLIA# [id number]    Reflex Added None.   Comment:    -------------------ADDITIONAL INFORMATION-------------------  This test was developed and its performance characteristics  determined by [facility] in a manner consistent with CLIA  requirements. This test has not been cleared or approved by  the U.S. Food and Drug Administration.        Enter \"miscellaneous lab order\" in Epic  Click \"Astley Clarke\"  Test number: \"SPPS\"  Test description:  \"Stiff-Person Spectrum Disorders Evaluation, including Progressive Encephalomyelitis with Rigidity and Myoclonus, Serum\"    [email or web address]   ??        Date of Visit: 1/23/2024      INTERVAL EVENTS:  Purvi Pearce is a 47 year old female who returns to clinic for follow up of chorea.  She was last seen 10/24/2023 at which time quetiapine was up-titrated.     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  If she tries to be still, this takes a lot of mental effort.  When she is focused on another task, her movements are more severe.  Present in her jaw and head.  The quetiapine is helpful at night.  If she misses a dose, she can't sleep.  The morning dose is causing some sleepiness.     Her mood is improved since returning to work but varies by day.  She works with a psychiatrist and psychologist.      Related Medications AM PM   Quetiapine 25mg 2 2       Orobuccolingual dyskinesias  No truncal chorea observed  Qtc 438        ASSESSMENT/PLAN:  Ms. Pearce is a 47 yo woman who returns to clinic for follow up of chorea.  She has residual chorea and quetiapine is causing daytime sleepiness.  We discussed the potential for worsened depression with VMAT2 inhibitors.  She is established with a  therapist and psychiatrist so elected to try a low dose with close " surveillance of her mood.     - Start Ingrezza 40mg daily, sent to insurance for prior auth (if not approved, use PAP)  - Continue quetiapine 50mg BID, plan to wean if VMAT2 inhibitor is effective  - RTC 2 months, 30 minutes video     Halley Lozoya MD     10/24/2023     Date of Visit: 10/24/23        INTERVAL EVENTS:  Purvi Pearce is a 47 year old female who returns to clinic for follow up of chorea.   She was last seen 6/7/2023 video at which time olanzapine was weaned and quetiapine was up-titrated.       She is accompanied by her  [place].     Purvi feels she is doing well overall on quetiapine. The head gets worse when she is stressed, nervous, or tired. The movements do not interfere with functioning or ADLs, but she feels some social embarrassment because of them. She avoids social situations due to the movements. She is in process of appealing the rejection of her disability claim.       Purvi notes she is no longer gaining weight and has lost some.     Denies increased sleepiness or lightheadedness.  No changes to gait or balance. No tremors.  Mood is stable.     She vapes nicotine daily. She smokes marijuana 5 days a week; this sometimes helps with the movements.         Related Medications AM   Quetiapine 25mg 2          ASSESSMENT/PLAN:  Purvi Pearce is a 47 year old female who returns to clinic for follow up of chorea. Genetic testing has thus far been unrevealing.  Olanzapine improved her body chorea but caused weight gain so was transitioned to quetiapine, which has been effective.       She is doing well on her current dose of quetiapine with minimal side effects. There is residual chorea that she is a bit bothered by, so we will opt to add a dose in the evening. In particular it interferes with sleep.  She was counseled to watch for sleepiness and further weight issues.      - Increase quetiapine: 50mg AM/25mg PM              - Can increase to 50mg BID if symptoms remain poorly controlled  -  RTC 3 months, 30 minutes video     7/20/2023  Significant Results  TEST REQUESTED:  Chorea panel on genome backbone.  Next generation sequencing and copy number variation analysis of genes listed in 'Background' section below.  RESULTS: INCONCLUSIVE  Pathogenic/Likely Pathogenic Variant(s): None Detected  Variant(s) of Uncertain Significance: One Detected  Interpretation  No pathogenic or likely pathogenic variants were detected in the genes analyzed. Therefore, a genetic cause for this patient's  symptoms was not identified. Of note, a variant of uncertain significance was detected in the PNKD gene. Genetic counseling  regarding these results is recommended.  VARIANTS OF UNCERTAIN SIGNIFICANCE:  Variants of uncertain clinical significance (VUS) are reported below. These variants cannot be definitively categorized as pathogenic  or benign at the present time. Caution should be exercised in ascribing clinical phenotypes to rare variants without additional  supporting evidence as the majority of rare/novel human variation is unlikely to cause Mendelian disease [Gary et al., 2012].  Correlation with clinical phenotype and analysis of other family members might help to clarify the significance of variants listed  below.  PNKD: [id number]; c.323A>G (p.[id number]), Het, Uncertain Significance  The c.323A>G missense variant in PNKD results in a p.Rbf180Xyp substitution. In the gnomAD database of approximately 140,000 controls, 51 individuals are heterozygous for this variant. This variant has conflicting interpretations in ClinVar: two laboratories report the variant as benign or likely benign and one laboratory reports it as a variant of uncertain significance. This variant was not identified in a review of relevant medical literature. In silico prediction models estimate this variant to be damaging; however, the accuracy of such models is limited. Due to insufficient data to clearly classify this variant as  pathogenic or benign, this variant is categorized as a variant of uncertain significance.  Lab PDF Result  .  Test Details  BACKGROUND:  Chorea Panel: ADCY5, CYRUS, APTX, CP, FOXG1, FTL, GNAO1, GPR88, JAM2, MYORG, NKX2.1, NRROS, PANK2, PDE10A, PDGFB,  PDGFRB, PNKD, PRRT2, AEN169, SCN8A, SETX, OXA64P5, STUB1, SYT1, VPS13A, XK, XPR1.  Printed: 8/10/2023 4:13 PM  Page: 1 of 4 Purvi Pearce [id number]  F, 1975  [facility]  [address]  [place] [address]  P: [phone]  F: [phone] Laboratory Report Status:  Purvi Pearce [id number]  F, 1975  [facility]  Printed: 8/10/2023 4:13 PM  Page: 2 of 4 Purvi Pearce [id number]  F, 1975  When indicated, EPCAM and GREM1 are analyzed for copy number variants only; the promoter regions of APC, BMPR1A, BRCA1,  BRCA2, MLH1, MSH2, PTEN, SMAD4, and TP53 are analyzed for sequence variants; the promoter regions of APC, BMPR1A, BRCA1,  BRCA2, GREM1, PTEN, and TP53 are analyzed for copy number variants; and MSH2 is analyzed for the exon 1-7 (Vitor) inversion by  PCR analysis using methods described by Hussein et al. [2002].     Visit 6/7/2023  Date of Visit: 6/7/2023     INTERVAL EVENTS:  Purvi Pearce is a 47 year old female who returns to clinic for follow up of chorea.   She was last seen 3/7/2023 at which time olanzapine was increased and trihexyphenidyl was discontinued.  In the interim, olanzapine was transitioned to quetiapine due to weight gain.  She is accompanied by her  who feels that her body chorea has significantly improved.  Denies increased sleepiness or lightheadedness.  No changes to gait or balance. No tremors.  Mood is stable.        Related Medications     Quetiapine 25mg 1   Olanzapine 0.5       Purvi Pearce is a 47 year old female who returns to clinic for follow up of chorea. Genetic testing has thus far been unrevealing.  Olanzapine improved her body chorea but caused weight gain so is being  transitioned to quetiapine.  Facial movement remain at this time.     - Increase quetiapine as needed              - Consider benzodiazepines next if antipsychotic falls  - RTC 6 months, 30 minutes video     Phone Call 2023  I spoke with Purvi Pearce about normal genetic testing for repeat mediated causes of chorea. She previously had a negative [place] disease test.  Her current round of testing excluded SCA17, DRPLA, A4ake28 and Friedreich ataxia. We did not pursue testing for HDL2 as this has only been described in patients with Sub-Saharan  descent.     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 The next testing option would be to pursue sequence-based testing for genetic causes of chorea including the following genes:ADCY5, CYRUS, ATPX, CP, FOXG1, FTL, GNAO1, GPR88, JAM2, MYORG, NKX2.1, NRROS, PANK2, PDE10A, PDGFB, PDGFRB, PNKD, PRRT2, WYZ307, SCN8A, SETX, LEH19D8, STUB1, SYT1, VPS13A, XK, ZVG3IXUU3, CYRUS, ATPX, CP, FOXG1, FTL, GNAO1, GPR88, JAM2, MYORG, NKX2.1, NRROS, PANK2, PDE10A, PDGFB, PDGFRB, PNKD, PRRT2, XFA709, SCN8A, SETX, YFK45M2, STUB1, SYT1, VPS13A, XK, XPR1        Vinicio Lopez visit 2023  Please see Dr. Lozoya's notes for a more thorough summary of medical history. Briefly, Purvi's family first noted the onset of abnormal movements in .  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  Purvi did have a NORMAL genetic test for [place] disease (18 and 20 repeats)- which excludes this diagnosis as the cause of her movement disorder.     FAMILY HISTORY-see scanned pedigree  1. Purvi's father  with dementia in his 70's (onset 60's). She does not believe that he had any abnormal movements.  2. No other family history of neurologic disease is reported.     GENETIC COUNSELING  We discussed the genetic and environmental basis of neurologic disease. I explained that in  most cases, it results from complex and lifelong interaction of multiple genetic and environmental factors. In some cases, there may be a single gene cause. I explained that the presence of additional family members and/or young ages at diagnosis are typically clues for a single-gene cause.     I explained that for chorea, the most common genetic cause is [place] disease. This diagnosis has been excluded in this case. Once HD has been ruled out, the remaining genetic causes of chorea are individually rare conditions.  Based on the methodologies needed to test for these, I think we could proceed with a two-step testing process:     1. Repeat mediated causes (these cannot be assessed by sequencing, so would exclude them first):              A. SCA17 and DRPLA can be assessed by repeat expansion testing through the [facility]              B. [id number] testing can be performed through [facility].              C. Chorea has been reported in very rare cases of Friedreich ataxia- while this is not a likely diagnosis, we can screen for this with a frataxin level. This is important because there is a newly approved disease-modifying treatment (SkyClarys)- so we would not want to miss this diagnosis.              D. HD-like 2 (HDL2) is very unlikely because it is only described so far in patients with sub-[facility]  ancestry.  Therefore, I did not think that this testing would be necessary at this point.        3/2/2023 Dr. Dowd  Date of Service: 3/2/2023  Provider: Amrita Dowd MD, Neurology    OFFICE NOTE    SITE: [facility] NEUROLOGY    SUBJECTIVE: Purvi, 47 years old, was seen in follow-up for chorea. 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 In July 2022, at her suggestion, she had DNA test for [place] disease on August 5, 2022 which was negative. She also had a neuropsychological evaluation  by Dr. Light, neuropsychologist in [place] on May 2, 2022 which showed multifactorial cognitive dysfunction. I referred her to the movement disorder clinic at the [facility]. She saw Dr. Halley Lozoya on December 9, 2022, when she was diagnosed with generalized chorea of unknown etiology. A paraneoplastic panel of December 9, 2022 was positive for P/Q- type calcium channel antibody (0.1). CT scan of the chest, abdomen and pelvis of January 31, 2023 was negative. She will see Dr. Osullivan in follow-up on March 9, 2023. She has no longer been on baclofen. She had no other complaints.      IMPRESSION AND PLAN:   Generalized chorea of still unknown etiology. DNA test for [place]'s disease of August 5, 2022 was negative. P/Q-type calcium channel antibody of December 9, 2022 was positive, but CT scan of the chest, abdomen and pelvis showed no evidence of malignancy. She will see in follow-up with Dr. Osullivan, specialist in movement disorder at the [facility] next week. I therefore have no further recommendation. She will have follow-up with me on an as-needed basis. I advised Purvi that I will be happy to see her in follow-up when she is discharged from the clinic of Dr. Osullivan.      1/17/2023 telemedicine  Miguel Ramos MD - 01/17/2023 3:45 PM CST  Formatting of this note is different from the original.  This was a virtual visit conducted via video teleconference per patient preference. The patient consented to completion of the visit via virtual technology.       SITE: Provider located at [facility]     SUBJECTIVE: I saw Purvi Pearce in the sleep clinic on 01/17/23 for follow-up of her excessive daytime sleepiness associated with a diagnosis of narcolepsy without cataplexy. I last saw her in October 2022. She had been previously followed at [facility] and [facility], and was receiving prescriptions for stimulant therapy (Adderall 20 mg 3 times daily at 6 AM, 10  AM, and 2 PM) through them in conjunction with management for other mental health conditions (bipolar disorder), but her stimulant therapy is now managed through sleep clinic.  She completed an overnight polysomnogram and multiple sleep latency tests in 2018. Her overnight polysomnogram was reassuring without evidence for sleep-disordered breathing. Her AHI was 0.2, and O2 desaturations to 91% were seen. She did display some periodic limb movements of sleep and only moderate intermittent snoring. There was no evidence for significant sleep-disordered breathing as a cause for her excessive daytime sleepiness, and multiple sleep latency test was conducted following the overnight sleep study. She had a sleep efficiency of 94% on her overnight polysomnogram and a total sleep time of 590 minutes. Multiple sleep latency tests revealed a mean sleep latency of 0.9 minutes after 5 nap opportunities, with sleep observed in all 5 naps. She also had sleep onset REM periods noted in all 5 nap opportunities as well with a very short REM sleep latency. 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 She had held stimulant medications for 14 days prior to her overnight polysomnogram. She had previously been prescribed methylphenidate and modafinil. She is currently taking amphetamine-dextroamphetamine (Adderall), which she finds is beneficial at the current dosage and with the current schedule (20 mg 6 AM, 10 AM, 2 PM). On days that she is home and is able to nap, she may not take the last dose. She still finds that she could fall asleep or nap on the medication, but she feels that she is able to maintain alertness for tasks that require her attention/vigilance. She denies limiting side effects.  She is still undergoing evaluation for involuntary movements/chorea. Paraneoplastic antibody panel came back positive for PQ type  calcium channel binding antibody. There does not seem to be any improvement in control of her involuntary movements on days that she does not dose stimulant therapy, or upon waking. She consumes caffeine She denies difficulty staying awake during her short commute. She has not previously tried other wake promoting agents, such as armodafinil, Sunosi, or Wakix.  Patient Active Problem List   Diagnosis     OCD (obsessive compulsive disorder)     PTSD (post-traumatic stress disorder)     Migraine without status migrainosus, not intractable, unspecified migraine type     Obesity, Class I, BMI 30.0-34.9 (see actual BMI)     Bipolar disorder, in partial remission, most recent episode depressed (HCC)     Narcolepsy without cataplexy(347.00)      IMPRESSION/PLAN: Purvi Pearce is a 47-year-old woman with hypersomnolence and polysomnographic evidence on MSLT for pathologic sleepiness and sleep onset REM periods in a pattern consistent with narcolepsy without cataplexy (type 2). She would like to continue her current management with Adderall 20 mg, 1 pill 3 times daily at 6 AM, 10 AM, and 2 PM as needed. I do not feel it is likely that her use of stimulant therapy is contributing to her current movement disorder presentation, as there is no fluctuation or dissipation of her movements at times that she does not take the medication. She indicates adequate supply of Adderall. I can submit updated orders to her pharmacy when needed. She is advised to avoid driving if drowsy. I will plan on followup with her in the sleep clinic in 4-6 months.     Patient Instructions   Continue Adderall 20 mg 2-3 times daily for management of excessive sleepiness associated with narcolepsy diagnosis  Try to ensure adequate sleep duration; there is no surrogate for getting sufficient sleep in order to feel adequately rested  Follow-up in sleep clinic in 4 to 6 months  Good luck with ongoing work-up for your involuntary movements         Date of  Visit: 2022     CC: involuntary movements     HPI:  Ms. Pearce is a 48 yo woman who presents for evaluation of involuntary movements.  Her movements began over 18 months year ago with a gradual onset.  At first, she didn't notice them but her  pointed them out.  Started around her mouth.  She can't suppress them.  Involves her whole body.  At night she has spasms while trying to fall asleep.  Worsened by being let go from her job.  Sometimes she feels an urge to move.  She is able to suppress it temporarily.  She thinks it's worse in the evenings.  She is stumbling more and dropping things.     She is also reporting cognitive changes.  For example, couldn't remember her passwords.  Loses her train of thought easily.  Also calling objects by the wrong name.  Has trouble with concepts of time.  May have interfered with her job.     Endorses dysphagia.     She had COVID in 2020.  She was on Abilify and Risperdal previously.  She has also taken antiemetics previously.  Zofran sounded familiar to her.     She took CD/LD but didn't notice any change. She has been on Cogentin for awhile but it is causing dry mouth.     They describe an episode where she was found in the kitchen with her hands full and appeared to be seeing things.  The following night she put a bowl and spoon in the kitchen in the middle of the night.  This happened last year.  No other clear episodes but he notices objects put away in odd places.     She is working with a psychiatrist every 3 months and a therapist weekly.        Previously on aripiprazole.  Started on CD/LD to rule out dopa-responsive dystonia.  CAG repeat: 18 and 20 (Normal)     Denies family hx of chorea.  Her mother had dementia and  in her early 70s.  She was in a nursing home for about 10 years.     LABS:  HD gene testing - RESULT: CAG repeat: 18 and 20 (Normal)  TSH 0.35 on 2022  Free T4 0.9 on 2022     IMAGING:  MRI brain w/o contrast 2022 -  personally reviewed: unremarkable      ASSESSMENT/PLAN:  Ms. Pearce is a 46 yo woman who presents for evaluation of involuntary movements.  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  The etiology of this is unknown but HD has been rule out.     - Lab tests: CBC with smear, RFP, LFTs, INOCENTE, copper, ceruloplasmin, ferritin, autoimmune movement disorder panel, paraneoplastic antibodies, PTH, TSH, vitamin B12  - Consider genetic testing  - Start olanzapine for chorea  - RTC 3 months 30 minutes        Halley Lozoya MD        ______________________________________      Patient was asked about 14 Review of systems including changes in vision (dry eyes, double vision), hearing, heart, lungs, musculoskeletal, depression, anxiety, snoring, RBD, insomnia, urinary frequency, urinary urgency, constipation, swallowing problems, hematological, ID, allergies, skin problems: seborrhea, endocrinological: thyroid, diabetes, cholesterol; balance, weight changes, and other neurological problems and these were not significant at this time except for   Patient Active Problem List   Diagnosis     Bipolar disorder, in full remission, most recent episode depressed     Migraine without status migrainosus, not intractable, unspecified migraine type     Narcolepsy without cataplexy(347.00)     Obesity, Class I, BMI 30.0-34.9 (see actual BMI)     OCD (obsessive compulsive disorder)     PTSD (post-traumatic stress disorder)     Anxiety     Major depression          Allergies   Allergen Reactions     Penicillins Hives     Past Surgical History:   Procedure Laterality Date     FOOT SURGERY Right      TONSILLECTOMY & ADENOIDECTOMY       TUBAL LIGATION       Past Medical History:   Diagnosis Date     Bipolar disorder (H)      Social History     Socioeconomic History     Marital status:      Spouse name: Not on file     Number of children: Not on file     Years of education: Not on file     Highest education level: Not on file   Occupational  History     Not on file   Tobacco Use     Smoking status: Former     Current packs/day: 1.00     Average packs/day: 1 pack/day for 7.0 years (7.0 ttl pk-yrs)     Types: Cigarettes     Smokeless tobacco: Never   Substance and Sexual Activity     Alcohol use: Yes     Alcohol/week: 2.0 standard drinks of alcohol     Types: 2 Shots of liquor per week     Comment: 2 drinks weekly     Drug use: Yes     Frequency: 5.0 times per week     Types: Marijuana     Sexual activity: Not on file   Other Topics Concern     Not on file   Social History Narrative        Denies family hx of chorea.  Her mother had dementia and  in her early 70s.  She was in a nursing home for about 10 years.        HD gene testing - RESULT: CAG repeat: 18 and 20 (Normal)    TSH 0.35 on 2022    Free T4 0.9 on 2022     Social Drivers of Health     Financial Resource Strain: Not on file   Food Insecurity: Not on file   Transportation Needs: Not on file   Physical Activity: Not on file   Stress: Not on file   Social Connections: Not on file   Interpersonal Safety: Not on file   Housing Stability: Not on file       Drug and lactation database from the [place] [employer]:  http://toxnet.nlm.nih.gov/cgi-bin/sis/htmlgen?LACT      B/P: Data Unavailable, T: Data Unavailable, P: Data Unavailable, R: Data Unavailable 0 lbs 0 oz  There were no vitals taken for this visit., There is no height or weight on file to calculate BMI.  Medications and Vitals not listed above were documented in the cart and reviewed by me.     Current Outpatient Medications   Medication Sig Dispense Refill     cephALEXin (KEFLEX) 500 MG capsule        ADVAIR DISKUS 100-50 MCG/ACT inhaler Inhale 1 puff into the lungs 2 times daily       amphetamine-dextroamphetamine (ADDERALL) 20 MG tablet Take 20 mg by mouth 3 times daily       busPIRone HCl (BUSPAR) 30 MG tablet Take 1 tablet by mouth 2 times daily       cholecalciferol 25 MCG (1000 UT) TABS Take 1,000  Units by mouth daily       ferrous sulfate (FEROSUL) 325 (65 Fe) MG tablet Take 325 mg by mouth daily (with breakfast)       Hypertonic Nasal Wash (SINUS RINSE) bottle Instill 1 Each nasally 2 (two) times a day.       QUEtiapine (SEROQUEL) 50 MG tablet Take 1 tablet (50 mg) by mouth 2 times daily 60 tablet 11     valbenazine (INGREZZA) 40 MG capsule Take 1 capsule (40 mg) by mouth daily. 30 capsule 11     VENTOLIN  (90 Base) MCG/ACT inhaler Inhale 1 puff into the lungs every 4 hours as needed       vitamin C (ASCORBIC ACID) 500 MG tablet Take 100 mg by mouth daily           Mitchel Giron MD      Again, thank you for allowing me to participate in the care of your patient.        Sincerely,        Mitchel Giron MD    Electronically signed

## 2025-03-25 LAB — MAYO MISC RESULT: NORMAL
